# Patient Record
Sex: FEMALE | Race: BLACK OR AFRICAN AMERICAN | NOT HISPANIC OR LATINO | Employment: FULL TIME | URBAN - METROPOLITAN AREA
[De-identification: names, ages, dates, MRNs, and addresses within clinical notes are randomized per-mention and may not be internally consistent; named-entity substitution may affect disease eponyms.]

---

## 2023-10-17 ENCOUNTER — HOSPITAL ENCOUNTER (EMERGENCY)
Facility: HOSPITAL | Age: 28
Discharge: HOME | End: 2023-10-17
Attending: EMERGENCY MEDICINE
Payer: MEDICAID

## 2023-10-17 VITALS
WEIGHT: 176.94 LBS | BODY MASS INDEX: 29.48 KG/M2 | DIASTOLIC BLOOD PRESSURE: 63 MMHG | TEMPERATURE: 98 F | RESPIRATION RATE: 20 BRPM | OXYGEN SATURATION: 99 % | SYSTOLIC BLOOD PRESSURE: 117 MMHG | HEART RATE: 95 BPM | HEIGHT: 65 IN

## 2023-10-17 DIAGNOSIS — R51.9 NONINTRACTABLE HEADACHE, UNSPECIFIED CHRONICITY PATTERN, UNSPECIFIED HEADACHE TYPE: Primary | ICD-10-CM

## 2023-10-17 DIAGNOSIS — Z87.898 H/O: PITUITARY TUMOR: ICD-10-CM

## 2023-10-17 LAB
ALBUMIN SERPL BCP-MCNC: 4.2 G/DL (ref 3.5–5.2)
ALP SERPL-CCNC: 80 U/L (ref 55–135)
ALT SERPL W/O P-5'-P-CCNC: 16 U/L (ref 10–44)
AMPHET+METHAMPHET UR QL: ABNORMAL
ANION GAP SERPL CALC-SCNC: 10 MMOL/L (ref 8–16)
AST SERPL-CCNC: 19 U/L (ref 10–40)
B-HCG UR QL: NEGATIVE
BARBITURATES UR QL SCN>200 NG/ML: NEGATIVE
BASOPHILS # BLD AUTO: 0.06 K/UL (ref 0–0.2)
BASOPHILS NFR BLD: 0.8 % (ref 0–1.9)
BENZODIAZ UR QL SCN>200 NG/ML: NEGATIVE
BILIRUB SERPL-MCNC: 0.3 MG/DL (ref 0.1–1)
BILIRUB UR QL STRIP: NEGATIVE
BUN SERPL-MCNC: 7 MG/DL (ref 6–20)
BZE UR QL SCN: NEGATIVE
CALCIUM SERPL-MCNC: 9.4 MG/DL (ref 8.7–10.5)
CANNABINOIDS UR QL SCN: NEGATIVE
CHLORIDE SERPL-SCNC: 107 MMOL/L (ref 95–110)
CLARITY UR: CLEAR
CO2 SERPL-SCNC: 22 MMOL/L (ref 23–29)
COLOR UR: COLORLESS
CREAT SERPL-MCNC: 0.8 MG/DL (ref 0.5–1.4)
CREAT UR-MCNC: 97.8 MG/DL (ref 15–325)
DIFFERENTIAL METHOD: ABNORMAL
EOSINOPHIL # BLD AUTO: 0 K/UL (ref 0–0.5)
EOSINOPHIL NFR BLD: 0.4 % (ref 0–8)
ERYTHROCYTE [DISTWIDTH] IN BLOOD BY AUTOMATED COUNT: 16.4 % (ref 11.5–14.5)
EST. GFR  (NO RACE VARIABLE): >60 ML/MIN/1.73 M^2
GLUCOSE SERPL-MCNC: 92 MG/DL (ref 70–110)
GLUCOSE UR QL STRIP: NEGATIVE
HCT VFR BLD AUTO: 33.5 % (ref 37–48.5)
HGB BLD-MCNC: 10.6 G/DL (ref 12–16)
HGB UR QL STRIP: NEGATIVE
IMM GRANULOCYTES # BLD AUTO: 0.01 K/UL (ref 0–0.04)
IMM GRANULOCYTES NFR BLD AUTO: 0.1 % (ref 0–0.5)
KETONES UR QL STRIP: ABNORMAL
LEUKOCYTE ESTERASE UR QL STRIP: NEGATIVE
LYMPHOCYTES # BLD AUTO: 1.7 K/UL (ref 1–4.8)
LYMPHOCYTES NFR BLD: 23.4 % (ref 18–48)
MAGNESIUM SERPL-MCNC: 1.9 MG/DL (ref 1.6–2.6)
MCH RBC QN AUTO: 26.2 PG (ref 27–31)
MCHC RBC AUTO-ENTMCNC: 31.6 G/DL (ref 32–36)
MCV RBC AUTO: 83 FL (ref 82–98)
METHADONE UR QL SCN>300 NG/ML: NEGATIVE
MONOCYTES # BLD AUTO: 0.4 K/UL (ref 0.3–1)
MONOCYTES NFR BLD: 6 % (ref 4–15)
NEUTROPHILS # BLD AUTO: 5.1 K/UL (ref 1.8–7.7)
NEUTROPHILS NFR BLD: 69.3 % (ref 38–73)
NITRITE UR QL STRIP: NEGATIVE
NRBC BLD-RTO: 0 /100 WBC
OPIATES UR QL SCN: NEGATIVE
PCP UR QL SCN>25 NG/ML: NEGATIVE
PH UR STRIP: 7 [PH] (ref 5–8)
PLATELET # BLD AUTO: 417 K/UL (ref 150–450)
PMV BLD AUTO: 9.3 FL (ref 9.2–12.9)
POTASSIUM SERPL-SCNC: 3.4 MMOL/L (ref 3.5–5.1)
PROT SERPL-MCNC: 7.6 G/DL (ref 6–8.4)
PROT UR QL STRIP: NEGATIVE
RBC # BLD AUTO: 4.05 M/UL (ref 4–5.4)
SODIUM SERPL-SCNC: 139 MMOL/L (ref 136–145)
SP GR UR STRIP: 1.01 (ref 1–1.03)
TOXICOLOGY INFORMATION: ABNORMAL
URN SPEC COLLECT METH UR: ABNORMAL
UROBILINOGEN UR STRIP-ACNC: NEGATIVE EU/DL
WBC # BLD AUTO: 7.34 K/UL (ref 3.9–12.7)

## 2023-10-17 PROCEDURE — 85025 COMPLETE CBC W/AUTO DIFF WBC: CPT | Performed by: EMERGENCY MEDICINE

## 2023-10-17 PROCEDURE — 63600175 PHARM REV CODE 636 W HCPCS: Performed by: EMERGENCY MEDICINE

## 2023-10-17 PROCEDURE — 96374 THER/PROPH/DIAG INJ IV PUSH: CPT

## 2023-10-17 PROCEDURE — 80053 COMPREHEN METABOLIC PANEL: CPT | Performed by: EMERGENCY MEDICINE

## 2023-10-17 PROCEDURE — 80307 DRUG TEST PRSMV CHEM ANLYZR: CPT | Performed by: EMERGENCY MEDICINE

## 2023-10-17 PROCEDURE — 96375 TX/PRO/DX INJ NEW DRUG ADDON: CPT

## 2023-10-17 PROCEDURE — 25000003 PHARM REV CODE 250: Performed by: EMERGENCY MEDICINE

## 2023-10-17 PROCEDURE — 81003 URINALYSIS AUTO W/O SCOPE: CPT | Mod: 59 | Performed by: EMERGENCY MEDICINE

## 2023-10-17 PROCEDURE — 83735 ASSAY OF MAGNESIUM: CPT | Performed by: EMERGENCY MEDICINE

## 2023-10-17 PROCEDURE — 99285 EMERGENCY DEPT VISIT HI MDM: CPT | Mod: 25

## 2023-10-17 PROCEDURE — 96361 HYDRATE IV INFUSION ADD-ON: CPT

## 2023-10-17 PROCEDURE — 81025 URINE PREGNANCY TEST: CPT | Performed by: EMERGENCY MEDICINE

## 2023-10-17 PROCEDURE — 99284 EMERGENCY DEPT VISIT MOD MDM: CPT | Mod: 25

## 2023-10-17 RX ORDER — PROCHLORPERAZINE MALEATE 5 MG
10 TABLET ORAL
Status: COMPLETED | OUTPATIENT
Start: 2023-10-17 | End: 2023-10-17

## 2023-10-17 RX ORDER — DEXAMETHASONE SODIUM PHOSPHATE 4 MG/ML
10 INJECTION, SOLUTION INTRA-ARTICULAR; INTRALESIONAL; INTRAMUSCULAR; INTRAVENOUS; SOFT TISSUE
Status: DISCONTINUED | OUTPATIENT
Start: 2023-10-17 | End: 2023-10-17

## 2023-10-17 RX ORDER — KETOROLAC TROMETHAMINE 30 MG/ML
30 INJECTION, SOLUTION INTRAMUSCULAR; INTRAVENOUS
Status: DISCONTINUED | OUTPATIENT
Start: 2023-10-17 | End: 2023-10-17

## 2023-10-17 RX ORDER — METOCLOPRAMIDE HYDROCHLORIDE 5 MG/ML
10 INJECTION INTRAMUSCULAR; INTRAVENOUS
Status: DISCONTINUED | OUTPATIENT
Start: 2023-10-17 | End: 2023-10-17

## 2023-10-17 RX ORDER — DIPHENHYDRAMINE HYDROCHLORIDE 50 MG/ML
25 INJECTION INTRAMUSCULAR; INTRAVENOUS
Status: COMPLETED | OUTPATIENT
Start: 2023-10-17 | End: 2023-10-17

## 2023-10-17 RX ORDER — KETOROLAC TROMETHAMINE 30 MG/ML
30 INJECTION, SOLUTION INTRAMUSCULAR; INTRAVENOUS
Status: COMPLETED | OUTPATIENT
Start: 2023-10-17 | End: 2023-10-17

## 2023-10-17 RX ADMIN — DIPHENHYDRAMINE HYDROCHLORIDE 25 MG: 50 INJECTION, SOLUTION INTRAMUSCULAR; INTRAVENOUS at 07:10

## 2023-10-17 RX ADMIN — KETOROLAC TROMETHAMINE 30 MG: 30 INJECTION, SOLUTION INTRAMUSCULAR; INTRAVENOUS at 07:10

## 2023-10-17 RX ADMIN — PROCHLORPERAZINE MALEATE 10 MG: 5 TABLET ORAL at 07:10

## 2023-10-17 RX ADMIN — SODIUM CHLORIDE 1000 ML: 9 INJECTION, SOLUTION INTRAVENOUS at 07:10

## 2023-10-17 NOTE — FIRST PROVIDER EVALUATION
"Medical screening examination initiated.  I have conducted a focused provider triage encounter, findings are as follows:    Brief history of present illness:  Patient with history of migraine headaches presents with migraine all day today.  Reports photophobia, nausea and vomiting.  History of pituitary tumor.    Vitals:    10/17/23 1822   BP: (!) 141/81   BP Location: Right arm   Patient Position: Sitting   Pulse: (!) 120   Resp: 18   Temp: 98.3 °F (36.8 °C)   TempSrc: Oral   SpO2: 100%   Weight: 80.3 kg (176 lb 14.7 oz)   Height: 5' 5" (1.651 m)       Pertinent physical exam:  Tachycardic, no acute distress noted.    Brief workup plan:  Meds    Preliminary workup initiated; this workup will be continued and followed by the physician or advanced practice provider that is assigned to the patient when roomed.  "

## 2023-10-18 NOTE — ED PROVIDER NOTES
SCRIBE #1 NOTE: I, Deanna Lehman, am scribing for, and in the presence of, Yaneli Vegas MD. I have scribed the HPI, MARIAA, Pex.    SCRIBE #2 NOTE: I, Thea Barahona, am scribing for, and in the presence of,  Salvador Hercules MD. I have scribed the remaining portions of the note not scribed by Scribe #1.      History     Chief Complaint   Patient presents with    Headache     Pt has pituitary tumor and is scheduled for neurosurgery next month. Pt has been experiencing headaches for 11 hours. Pt c/o nausea, double vision and feeling like her heart is beating fast.      Review of patient's allergies indicates:  No Known Allergies      History of Present Illness     HPI    10/17/2023, 7:03 PM  History obtained from the patient      History of Present Illness: Zoila Sheldon is a 28 y.o. female patient with a  PMHx of pituitary tumor who presents to the Emergency Department for evaluation of headaches which onset 5:00 a.m. Pt reports just moving to this state and has not obtained new pcp. Pt states she has neurosurgery next month to remove the tumor. Symptoms are constant and moderate in severity. No mitigating or exacerbating factors reported. Associated sxs include nausea, sob, back pain. Patient denies any cp, weakness, dizziness, dysuria, fever and all other sxs at this time. No prior Tx reported. No further complaints or concerns at this time.       Arrival mode: Personal vehicle      PCP: Brittney Eastman MD        Past Medical History:  No past medical history on file.    Past Surgical History:  No past surgical history on file.      Family History:  No family history on file.    Social History:  Social History     Tobacco Use    Smoking status: Never    Smokeless tobacco: Never   Substance and Sexual Activity    Alcohol use: Not on file    Drug use: Not on file    Sexual activity: Not on file        Review of Systems     Review of Systems   Constitutional:  Negative for fever.   HENT:  Negative for  "sore throat.    Respiratory:  Positive for shortness of breath.    Cardiovascular:  Negative for chest pain.   Gastrointestinal:  Positive for nausea.   Genitourinary:  Negative for dysuria.   Musculoskeletal:  Positive for back pain.   Skin:  Negative for rash.   Neurological:  Negative for dizziness and weakness.   Hematological:  Does not bruise/bleed easily.   All other systems reviewed and are negative.       Physical Exam     Initial Vitals [10/17/23 1822]   BP Pulse Resp Temp SpO2   (!) 141/81 (!) 120 18 98.3 °F (36.8 °C) 100 %      MAP       --          Physical Exam  Nursing Notes and Vital Signs Reviewed.  Constitutional: Patient is in no apparent distress. Well-developed and well-nourished.  Head: Atraumatic. Normocephalic.  Eyes: PERRL. EOM intact. Conjunctivae are not pale. No scleral icterus.  ENT: Mucous membranes are moist. Oropharynx is clear and symmetric.    Neck: Supple. Full ROM. No lymphadenopathy.  Cardiovascular: Regular rate. Regular rhythm. No murmurs, rubs, or gallops. Distal pulses are 2+ and symmetric.  Pulmonary/Chest: No respiratory distress. Clear to auscultation bilaterally. No wheezing or rales.  Abdominal: Soft and non-distended.  There is no tenderness.  No rebound, guarding, or rigidity. Good bowel sounds.  Genitourinary: No CVA tenderness  Musculoskeletal: Moves all extremities. No obvious deformities. No edema. No calf tenderness.  Skin: Warm and dry.  Neurological:  Alert, awake, and appropriate.  Normal speech.  No acute focal neurological deficits are appreciated.  Psychiatric: Normal affect. Good eye contact. Appropriate in content.     ED Course   Procedures  ED Vital Signs:  Vitals:    10/17/23 1822 10/17/23 1918 10/17/23 2030 10/17/23 2100   BP: (!) 141/81 131/82 135/78 128/75   Pulse: (!) 120 95 99 90   Resp: 18 20 (!) 23 19   Temp: 98.3 °F (36.8 °C)      TempSrc: Oral      SpO2: 100% 100% 100% 100%   Weight: 80.3 kg (176 lb 14.7 oz)      Height: 5' 5" (1.651 m)       " 10/17/23 2200 10/17/23 2230   BP: 124/74 117/63   Pulse: 89 95   Resp: 19 20   Temp:     TempSrc:     SpO2: 99% 99%   Weight:     Height:         Abnormal Lab Results:  Labs Reviewed   CBC W/ AUTO DIFFERENTIAL - Abnormal; Notable for the following components:       Result Value    Hemoglobin 10.6 (*)     Hematocrit 33.5 (*)     MCH 26.2 (*)     MCHC 31.6 (*)     RDW 16.4 (*)     All other components within normal limits   COMPREHENSIVE METABOLIC PANEL - Abnormal; Notable for the following components:    Potassium 3.4 (*)     CO2 22 (*)     All other components within normal limits   URINALYSIS, REFLEX TO URINE CULTURE - Abnormal; Notable for the following components:    Color, UA Colorless (*)     Ketones, UA 1+ (*)     All other components within normal limits    Narrative:     Specimen Source->Urine   DRUG SCREEN PANEL, URINE EMERGENCY - Abnormal; Notable for the following components:    Amphetamine Screen, Ur Presumptive Positive (*)     All other components within normal limits    Narrative:     Specimen Source->Urine   PREGNANCY TEST, URINE RAPID    Narrative:     Specimen Source->Urine   MAGNESIUM        All Lab Results:  Results for orders placed or performed during the hospital encounter of 10/17/23   CBC auto differential   Result Value Ref Range    WBC 7.34 3.90 - 12.70 K/uL    RBC 4.05 4.00 - 5.40 M/uL    Hemoglobin 10.6 (L) 12.0 - 16.0 g/dL    Hematocrit 33.5 (L) 37.0 - 48.5 %    MCV 83 82 - 98 fL    MCH 26.2 (L) 27.0 - 31.0 pg    MCHC 31.6 (L) 32.0 - 36.0 g/dL    RDW 16.4 (H) 11.5 - 14.5 %    Platelets 417 150 - 450 K/uL    MPV 9.3 9.2 - 12.9 fL    Immature Granulocytes 0.1 0.0 - 0.5 %    Gran # (ANC) 5.1 1.8 - 7.7 K/uL    Immature Grans (Abs) 0.01 0.00 - 0.04 K/uL    Lymph # 1.7 1.0 - 4.8 K/uL    Mono # 0.4 0.3 - 1.0 K/uL    Eos # 0.0 0.0 - 0.5 K/uL    Baso # 0.06 0.00 - 0.20 K/uL    nRBC 0 0 /100 WBC    Gran % 69.3 38.0 - 73.0 %    Lymph % 23.4 18.0 - 48.0 %    Mono % 6.0 4.0 - 15.0 %    Eosinophil %  0.4 0.0 - 8.0 %    Basophil % 0.8 0.0 - 1.9 %    Differential Method Automated    Comprehensive metabolic panel   Result Value Ref Range    Sodium 139 136 - 145 mmol/L    Potassium 3.4 (L) 3.5 - 5.1 mmol/L    Chloride 107 95 - 110 mmol/L    CO2 22 (L) 23 - 29 mmol/L    Glucose 92 70 - 110 mg/dL    BUN 7 6 - 20 mg/dL    Creatinine 0.8 0.5 - 1.4 mg/dL    Calcium 9.4 8.7 - 10.5 mg/dL    Total Protein 7.6 6.0 - 8.4 g/dL    Albumin 4.2 3.5 - 5.2 g/dL    Total Bilirubin 0.3 0.1 - 1.0 mg/dL    Alkaline Phosphatase 80 55 - 135 U/L    AST 19 10 - 40 U/L    ALT 16 10 - 44 U/L    eGFR >60 >60 mL/min/1.73 m^2    Anion Gap 10 8 - 16 mmol/L   Urinalysis, Reflex to Urine Culture Urine, Clean Catch    Specimen: Urine   Result Value Ref Range    Specimen UA Urine, Clean Catch     Color, UA Colorless (A) Yellow, Straw, Nika    Appearance, UA Clear Clear    pH, UA 7.0 5.0 - 8.0    Specific Gravity, UA 1.010 1.005 - 1.030    Protein, UA Negative Negative    Glucose, UA Negative Negative    Ketones, UA 1+ (A) Negative    Bilirubin (UA) Negative Negative    Occult Blood UA Negative Negative    Nitrite, UA Negative Negative    Urobilinogen, UA Negative <2.0 EU/dL    Leukocytes, UA Negative Negative   Pregnancy, urine rapid (UPT)   Result Value Ref Range    Preg Test, Ur Negative    Drug screen panel, emergency   Result Value Ref Range    Benzodiazepines Negative Negative    Methadone metabolites Negative Negative    Cocaine (Metab.) Negative Negative    Opiate Scrn, Ur Negative Negative    Barbiturate Screen, Ur Negative Negative    Amphetamine Screen, Ur Presumptive Positive (A) Negative    THC Negative Negative    Phencyclidine Negative Negative    Creatinine, Urine 97.8 15.0 - 325.0 mg/dL    Toxicology Information SEE COMMENT    Magnesium   Result Value Ref Range    Magnesium 1.9 1.6 - 2.6 mg/dL        Imaging Results:  Imaging Results              CT Head Without Contrast (Final result)  Result time 10/17/23 22:08:52      Final  result by Abraham Rodriguez MD (10/17/23 22:08:52)                   Impression:      No acute abnormality.    No definite abnormality in this patient with a history of pituitary tumor, although contrast-enhanced MRI would provide a more sensitive evaluation.    All CT scans at this facility are performed  using dose modulation techniques as appropriate to performed exam including the following:  automated exposure control; adjustment of mA and/or kV according to the patients size (this includes techniques or standardized protocols for targeted exams where dose is matched to indication/reason for exam: i.e. extremities or head);  iterative reconstruction technique.      Electronically signed by: Abraham Rodriguez  Date:    10/17/2023  Time:    22:08               Narrative:    EXAMINATION:  CT HEAD WITHOUT CONTRAST    CLINICAL HISTORY:  Headache, chronic, new features or increased frequency;h/o pituitary tumor;    TECHNIQUE:  Low dose axial CT images obtained throughout the head without intravenous contrast. Sagittal and coronal reconstructions were performed.    COMPARISON:  None.    FINDINGS:  Intracranial compartment:    Ventricles and sulci are normal in size for age without evidence of hydrocephalus. No extra-axial blood or fluid collections.    The brain parenchyma appears normal. No parenchymal mass, hemorrhage, edema or major vascular distribution infarct.    Pituitary grossly within normal limits via CT. No definite abnormality in this patient with a history of pituitary tumor, although contrast-enhanced MRI would provide a more sensitive evaluation.    Skull/extracranial contents (limited evaluation): No fracture. Mastoid air cells and paranasal sinuses are essentially clear.                                        ED Discussion     8:00 PM: Dr. Vegas transfers care of patient to Dr. Hercules pending lab results.     8:06 PM: Dr. Hercules agrees with Dr. Vegas's assessment and plan of care. Evaluated pt. Pt  is resting comfortably and is in no acute distress.  D/w pt all pertinent results. D/w pt any concerns expressed at this time. Answered all questions. Pt expresses understanding at this time.    10:36 PM: Reassessed pt at this time.  Discussed with pt all pertinent ED information and results. Discussed pt dx and plan of tx. Gave pt all f/u and return to the ED instructions. All questions and concerns were addressed at this time. Pt expresses understanding of information and instructions, and is comfortable with plan to discharge. Pt is stable for discharge.    I discussed with patient and/or family/caretaker that evaluation in the ED does not suggest any emergent or life threatening medical conditions requiring immediate intervention beyond what was provided in the ED, and I believe patient is safe for discharge.  Regardless, an unremarkable evaluation in the ED does not preclude the development or presence of a serious of life threatening condition. As such, patient was instructed to return immediately for any worsening or change in current symptoms.         Medical Decision Making  Amount and/or Complexity of Data Reviewed  Labs: ordered. Decision-making details documented in ED Course.  Radiology: ordered. Decision-making details documented in ED Course.    Risk  Prescription drug management.                ED Medication(s):  Medications   sodium chloride 0.9% bolus 1,000 mL 1,000 mL (0 mLs Intravenous Stopped 10/17/23 2045)   ketorolac injection 30 mg (30 mg Intravenous Given 10/17/23 1917)   diphenhydrAMINE injection 25 mg (25 mg Intravenous Given 10/17/23 1921)   prochlorperazine tablet 10 mg (10 mg Oral Given 10/17/23 1916)       Discharge Medication List as of 10/17/2023 10:43 PM           Follow-up Information       Brittney Eastman MD In 2 days.    Specialty: Pediatrics  Contact information:  [address]  [place] [address]  [phone]               [facility] - Emergency Dept..    Specialty:  Emergency Medicine  Why: As needed, If symptoms worsen  Contact information:  [address]  [facility] [address] [phone]             Brittney Eastman MD In 2 days.    Specialty: Pediatrics  Contact information:  [address]  [place] [address]  [phone]               [facility] - Emergency Dept..    Specialty: Emergency Medicine  Why: As needed, If symptoms worsen  Contact information:  [address] [facility] [address] [phone]                               Scribe Attestation:   Scribe #1: I performed the above scribed service and the documentation accurately describes the services I performed. I attest to the accuracy of the note.     Attending:   Physician Attestation Statement for Scribe #1: I, Yaneli Vegas MD, personally performed the services described in this documentation, as scribed by Deanna Lehman, in my presence, and it is both accurate and complete.       Scribe Attestation:   Scribe #2: I performed the above scribed service and the documentation accurately describes the services I performed. I attest to the accuracy of the note.    Attending Attestation:           Physician Attestation for Scribe:    Physician Attestation Statement for Scribe #2: I, Salvador Hercules MD, reviewed documentation, as scribed by Thea Barahona in my presence, and it is both accurate and complete. I also acknowledge and confirm the content of the note done by Scribe #1.           Clinical Impression       ICD-10-CM ICD-9-CM   1. Nonintractable headache, unspecified chronicity pattern, unspecified headache type  R51.9 784.0   2. H/O: pituitary tumor  Z87.898 V12.29       Disposition:   Disposition: Discharged  Condition: Stable        Salvador Hercules MD  11/09/23 0206

## 2023-11-02 ENCOUNTER — TELEPHONE (OUTPATIENT)
Dept: NEUROSURGERY | Facility: CLINIC | Age: 28
End: 2023-11-02
Payer: MEDICAID

## 2023-11-02 NOTE — TELEPHONE ENCOUNTER
Attempted to contact patient regarding appointment with Dr. EUGENE neurosurgery. Patient needs to follow up with a neurosurgeon in [place] due to patient's condition being brain related. Cellular not in service.

## 2023-11-09 ENCOUNTER — HOSPITAL ENCOUNTER (EMERGENCY)
Facility: HOSPITAL | Age: 28
Discharge: HOME | End: 2023-11-09
Attending: EMERGENCY MEDICINE
Payer: MEDICAID

## 2023-11-09 VITALS
HEIGHT: 65 IN | OXYGEN SATURATION: 100 % | RESPIRATION RATE: 14 BRPM | BODY MASS INDEX: 29.49 KG/M2 | DIASTOLIC BLOOD PRESSURE: 69 MMHG | TEMPERATURE: 99 F | SYSTOLIC BLOOD PRESSURE: 121 MMHG | HEART RATE: 100 BPM | WEIGHT: 177 LBS

## 2023-11-09 DIAGNOSIS — J06.9 VIRAL URI: Primary | ICD-10-CM

## 2023-11-09 DIAGNOSIS — R07.9 CHEST PAIN: ICD-10-CM

## 2023-11-09 DIAGNOSIS — R00.0 SINUS TACHYCARDIA: ICD-10-CM

## 2023-11-09 DIAGNOSIS — E87.6 HYPOKALEMIA: ICD-10-CM

## 2023-11-09 LAB
ALBUMIN SERPL BCP-MCNC: 4.4 G/DL (ref 3.5–5.2)
ALP SERPL-CCNC: 71 U/L (ref 55–135)
ALT SERPL W/O P-5'-P-CCNC: 32 U/L (ref 10–44)
ANION GAP SERPL CALC-SCNC: 12 MMOL/L (ref 8–16)
AST SERPL-CCNC: 47 U/L (ref 10–40)
B-HCG UR QL: NEGATIVE
BASOPHILS # BLD AUTO: 0.06 K/UL (ref 0–0.2)
BASOPHILS NFR BLD: 0.4 % (ref 0–1.9)
BILIRUB SERPL-MCNC: 0.6 MG/DL (ref 0.1–1)
BNP SERPL-MCNC: <10 PG/ML (ref 0–99)
BUN SERPL-MCNC: 9 MG/DL (ref 6–20)
CALCIUM SERPL-MCNC: 9.4 MG/DL (ref 8.7–10.5)
CHLORIDE SERPL-SCNC: 107 MMOL/L (ref 95–110)
CO2 SERPL-SCNC: 18 MMOL/L (ref 23–29)
CREAT SERPL-MCNC: 0.9 MG/DL (ref 0.5–1.4)
D DIMER PPP IA.FEU-MCNC: 0.41 MG/L FEU
DIFFERENTIAL METHOD: ABNORMAL
EOSINOPHIL # BLD AUTO: 0 K/UL (ref 0–0.5)
EOSINOPHIL NFR BLD: 0.1 % (ref 0–8)
ERYTHROCYTE [DISTWIDTH] IN BLOOD BY AUTOMATED COUNT: 16.9 % (ref 11.5–14.5)
EST. GFR  (NO RACE VARIABLE): >60 ML/MIN/1.73 M^2
GLUCOSE SERPL-MCNC: 102 MG/DL (ref 70–110)
HCT VFR BLD AUTO: 34.9 % (ref 37–48.5)
HGB BLD-MCNC: 11.1 G/DL (ref 12–16)
IMM GRANULOCYTES # BLD AUTO: 0.07 K/UL (ref 0–0.04)
IMM GRANULOCYTES NFR BLD AUTO: 0.5 % (ref 0–0.5)
INFLUENZA A, MOLECULAR: NEGATIVE
INFLUENZA B, MOLECULAR: NEGATIVE
LYMPHOCYTES # BLD AUTO: 0.6 K/UL (ref 1–4.8)
LYMPHOCYTES NFR BLD: 4.6 % (ref 18–48)
MCH RBC QN AUTO: 26.2 PG (ref 27–31)
MCHC RBC AUTO-ENTMCNC: 31.8 G/DL (ref 32–36)
MCV RBC AUTO: 83 FL (ref 82–98)
MONOCYTES # BLD AUTO: 0.4 K/UL (ref 0.3–1)
MONOCYTES NFR BLD: 3.3 % (ref 4–15)
NEUTROPHILS # BLD AUTO: 12.3 K/UL (ref 1.8–7.7)
NEUTROPHILS NFR BLD: 91.1 % (ref 38–73)
NRBC BLD-RTO: 0 /100 WBC
PLATELET # BLD AUTO: 406 K/UL (ref 150–450)
PMV BLD AUTO: 9.6 FL (ref 9.2–12.9)
POTASSIUM SERPL-SCNC: 3.4 MMOL/L (ref 3.5–5.1)
PROT SERPL-MCNC: 7.9 G/DL (ref 6–8.4)
RBC # BLD AUTO: 4.23 M/UL (ref 4–5.4)
SARS-COV-2 RDRP RESP QL NAA+PROBE: NEGATIVE
SODIUM SERPL-SCNC: 137 MMOL/L (ref 136–145)
SPECIMEN SOURCE: NORMAL
TROPONIN I SERPL DL<=0.01 NG/ML-MCNC: <0.006 NG/ML (ref 0–0.03)
WBC # BLD AUTO: 13.45 K/UL (ref 3.9–12.7)

## 2023-11-09 PROCEDURE — 87502 INFLUENZA DNA AMP PROBE: CPT | Performed by: EMERGENCY MEDICINE

## 2023-11-09 PROCEDURE — 93010 ELECTROCARDIOGRAM REPORT: CPT | Mod: ,,, | Performed by: INTERNAL MEDICINE

## 2023-11-09 PROCEDURE — 25000003 PHARM REV CODE 250: Performed by: EMERGENCY MEDICINE

## 2023-11-09 PROCEDURE — 83880 ASSAY OF NATRIURETIC PEPTIDE: CPT | Performed by: NURSE PRACTITIONER

## 2023-11-09 PROCEDURE — 85025 COMPLETE CBC W/AUTO DIFF WBC: CPT | Performed by: NURSE PRACTITIONER

## 2023-11-09 PROCEDURE — 93005 ELECTROCARDIOGRAM TRACING: CPT

## 2023-11-09 PROCEDURE — 96360 HYDRATION IV INFUSION INIT: CPT

## 2023-11-09 PROCEDURE — 99285 EMERGENCY DEPT VISIT HI MDM: CPT | Mod: 25

## 2023-11-09 PROCEDURE — 85379 FIBRIN DEGRADATION QUANT: CPT | Performed by: NURSE PRACTITIONER

## 2023-11-09 PROCEDURE — 84484 ASSAY OF TROPONIN QUANT: CPT | Performed by: NURSE PRACTITIONER

## 2023-11-09 PROCEDURE — 93010 EKG 12-LEAD: ICD-10-PCS | Mod: ,,, | Performed by: INTERNAL MEDICINE

## 2023-11-09 PROCEDURE — 80053 COMPREHEN METABOLIC PANEL: CPT | Performed by: NURSE PRACTITIONER

## 2023-11-09 PROCEDURE — 36415 COLL VENOUS BLD VENIPUNCTURE: CPT | Performed by: NURSE PRACTITIONER

## 2023-11-09 PROCEDURE — U0002 COVID-19 LAB TEST NON-CDC: HCPCS | Performed by: EMERGENCY MEDICINE

## 2023-11-09 PROCEDURE — 81025 URINE PREGNANCY TEST: CPT | Performed by: EMERGENCY MEDICINE

## 2023-11-09 RX ORDER — POTASSIUM CHLORIDE 20 MEQ/1
40 TABLET, EXTENDED RELEASE ORAL
Status: COMPLETED | OUTPATIENT
Start: 2023-11-09 | End: 2023-11-09

## 2023-11-09 RX ADMIN — POTASSIUM BICARBONATE 25 MEQ: 978 TABLET, EFFERVESCENT ORAL at 11:11

## 2023-11-09 RX ADMIN — POTASSIUM CHLORIDE 40 MEQ: 1500 TABLET, EXTENDED RELEASE ORAL at 11:11

## 2023-11-09 RX ADMIN — SODIUM CHLORIDE 500 ML: 9 INJECTION, SOLUTION INTRAVENOUS at 10:11

## 2023-11-09 NOTE — ED NOTES
Pt vomited after PO potassium s/t to size. MD notified. Pt reports difficulty swallowing disintegrating potassium s/t taste. MD notified. Pt to attempt to slowly drink disintegrating potassium.

## 2023-11-09 NOTE — ED PROVIDER NOTES
"SCRIBE #1 NOTE: I, Inés Hamilton, am scribing for, and in the presence of, Evan Hannon Jr., MD. I have scribed the entire note.       History     Chief Complaint   Patient presents with    Chest Pain     Chest pain x several days worsened today, pt reports all extremity weakness and states "my whole body feels heavy"      Review of patient's allergies indicates:  No Known Allergies      History of Present Illness     HPI    11/9/2023, 10:22 AM  History obtained from the patient      History of Present Illness: Zoila Sheldon is a 28 y.o. female patient with a PMHx of adrenal deficiency and pituitary tumor  who presents to the Emergency Department for evaluation of CP which onset a few days PTA. Pt states that her chest began hurting a few days ago and it felt like pressure on her chest, but today the pain became unbearable. She states that the pain feels like a stinging pain. Symptoms are constant and moderate in severity. No mitigating or exacerbating factors reported. Associated sxs include light-headedness, double vision. Patient denies any n/v, fever, chills, cough, SOB, headaches, and all other sxs at this time. No prior Tx mentioned. She denies any recent travel, recent surgeries, or history of smoking. No further complaints or concerns at this time.       Arrival mode: Personal vehicle    PCP: Brittney Eastman MD        Past Medical History:  Past Medical History:   Diagnosis Date    Pituitary tumor        Past Surgical History:  No past surgical history on file.      Family History:  No family history on file.    Social History:  Social History     Tobacco Use    Smoking status: Never    Smokeless tobacco: Never   Substance and Sexual Activity    Alcohol use: Not on file    Drug use: Not on file    Sexual activity: Not on file        Review of Systems     Review of Systems   Constitutional:  Negative for chills and fever.   HENT:  Negative for sore throat.    Eyes:         (+) " double vision   Respiratory:  Negative for cough and shortness of breath.    Cardiovascular:  Positive for chest pain.   Gastrointestinal:  Negative for nausea and vomiting.   Genitourinary:  Negative for dysuria.   Musculoskeletal:  Negative for back pain.   Skin:  Negative for rash.   Neurological:  Positive for light-headedness. Negative for weakness and headaches.   Hematological:  Does not bruise/bleed easily.   All other systems reviewed and are negative.       Physical Exam     Initial Vitals [11/09/23 0934]   BP Pulse Resp Temp SpO2   111/72 (!) 118 20 98.8 °F (37.1 °C) 100 %      MAP       --          Physical Exam  Nursing Notes and Vital Signs Reviewed.  Constitutional: Patient is in no acute distress. Well-developed and well-nourished.  Head: Atraumatic. Normocephalic.  Eyes:  EOM intact.  No scleral icterus.  ENT: Mucous membranes are moist.  Nares clear   Neck:  Full ROM. No JVD.  Cardiovascular: Regular rate. Regular rhythm No murmurs, rubs, or gallops. Distal pulses are 2+ and symmetric  Pulmonary/Chest: No respiratory distress. Clear to auscultation bilaterally. No wheezing or rales.  Equal chest wall rise bilaterally  Abdominal: Soft and non-distended.  There is no tenderness.  No rebound, guarding, or rigidity. Good bowel sounds.  Genitourinary: No CVA tenderness.  No suprapubic tenderness  Musculoskeletal: Moves all extremities. No obvious deformities.  5 x 5 strength in all extremities   Skin: Warm and dry.  Neurological:  Alert, awake, and appropriate.  Normal speech.  No acute focal neurological deficits are appreciated.  Two through 12 intact bilaterally.  Psychiatric: Normal affect. Good eye contact. Appropriate in content.      ED Course   Procedures  ED Vital Signs:  Vitals:    11/09/23 0934 11/09/23 1015 11/09/23 1016   BP: 111/72 131/75    Pulse: (!) 118 109 109   Resp: 20 18    Temp: 98.8 °F (37.1 °C)     TempSrc: Oral     SpO2: 100% 100%    Weight: 80.3 kg (177 lb 0.5 oz)     Height:  "5' 5" (1.651 m)         Abnormal Lab Results:  Labs Reviewed   CBC W/ AUTO DIFFERENTIAL - Abnormal; Notable for the following components:       Result Value    WBC 13.45 (*)     Hemoglobin 11.1 (*)     Hematocrit 34.9 (*)     MCH 26.2 (*)     MCHC 31.8 (*)     RDW 16.9 (*)     Gran # (ANC) 12.3 (*)     Immature Grans (Abs) 0.07 (*)     Lymph # 0.6 (*)     Gran % 91.1 (*)     Lymph % 4.6 (*)     Mono % 3.3 (*)     All other components within normal limits   COMPREHENSIVE METABOLIC PANEL - Abnormal; Notable for the following components:    Potassium 3.4 (*)     CO2 18 (*)     AST 47 (*)     All other components within normal limits   INFLUENZA A & B BY MOLECULAR   TROPONIN I   B-TYPE NATRIURETIC PEPTIDE   SARS-COV-2 RNA AMPLIFICATION, QUAL   PREGNANCY TEST, URINE RAPID    Narrative:     Specimen Source->Urine   D DIMER, QUANTITATIVE   D DIMER, QUANTITATIVE   TROPONIN I        All Lab Results:  Results for orders placed or performed during the hospital encounter of 11/09/23   Influenza A & B by Molecular    Specimen: Nasopharyngeal Swab   Result Value Ref Range    Influenza A, Molecular Negative Negative    Influenza B, Molecular Negative Negative    Flu A & B Source Nasal swab    CBC auto differential   Result Value Ref Range    WBC 13.45 (H) 3.90 - 12.70 K/uL    RBC 4.23 4.00 - 5.40 M/uL    Hemoglobin 11.1 (L) 12.0 - 16.0 g/dL    Hematocrit 34.9 (L) 37.0 - 48.5 %    MCV 83 82 - 98 fL    MCH 26.2 (L) 27.0 - 31.0 pg    MCHC 31.8 (L) 32.0 - 36.0 g/dL    RDW 16.9 (H) 11.5 - 14.5 %    Platelets 406 150 - 450 K/uL    MPV 9.6 9.2 - 12.9 fL    Immature Granulocytes 0.5 0.0 - 0.5 %    Gran # (ANC) 12.3 (H) 1.8 - 7.7 K/uL    Immature Grans (Abs) 0.07 (H) 0.00 - 0.04 K/uL    Lymph # 0.6 (L) 1.0 - 4.8 K/uL    Mono # 0.4 0.3 - 1.0 K/uL    Eos # 0.0 0.0 - 0.5 K/uL    Baso # 0.06 0.00 - 0.20 K/uL    nRBC 0 0 /100 WBC    Gran % 91.1 (H) 38.0 - 73.0 %    Lymph % 4.6 (L) 18.0 - 48.0 %    Mono % 3.3 (L) 4.0 - 15.0 %    Eosinophil % 0.1 " 0.0 - 8.0 %    Basophil % 0.4 0.0 - 1.9 %    Differential Method Automated    Comprehensive metabolic panel   Result Value Ref Range    Sodium 137 136 - 145 mmol/L    Potassium 3.4 (L) 3.5 - 5.1 mmol/L    Chloride 107 95 - 110 mmol/L    CO2 18 (L) 23 - 29 mmol/L    Glucose 102 70 - 110 mg/dL    BUN 9 6 - 20 mg/dL    Creatinine 0.9 0.5 - 1.4 mg/dL    Calcium 9.4 8.7 - 10.5 mg/dL    Total Protein 7.9 6.0 - 8.4 g/dL    Albumin 4.4 3.5 - 5.2 g/dL    Total Bilirubin 0.6 0.1 - 1.0 mg/dL    Alkaline Phosphatase 71 55 - 135 U/L    AST 47 (H) 10 - 40 U/L    ALT 32 10 - 44 U/L    eGFR >60 >60 mL/min/1.73 m^2    Anion Gap 12 8 - 16 mmol/L   Troponin I #1   Result Value Ref Range    Troponin I <0.006 0.000 - 0.026 ng/mL   BNP   Result Value Ref Range    BNP <10 0 - 99 pg/mL   COVID-19 Rapid Screening   Result Value Ref Range    SARS-CoV-2 RNA, Amplification, Qual Negative Negative   Pregnancy, urine rapid (UPT)   Result Value Ref Range    Preg Test, Ur Negative    D-Dimer, Quantitative   Result Value Ref Range    D-Dimer 0.41 <0.50 mg/L FEU        Imaging Results:  Imaging Results              X-Ray Chest AP Portable (Final result)  Result time 11/09/23 10:34:53      Final result by Gaurang Palacios MD (11/09/23 10:34:53)                   Impression:      No acute findings.      Electronically signed by: Gaurang Palacios MD  Date:    11/09/2023  Time:    10:34               Narrative:    EXAMINATION:  XR CHEST AP PORTABLE    CLINICAL HISTORY:  Chest Pain;    TECHNIQUE:  Single frontal view of the chest was performed.    COMPARISON:  None    FINDINGS:  The cardiomediastinal silhouette is normal.    The lungs are clear.  No pleural effusions.    No acute osseous findings.  No advanced arthritic changes.                                       The EKG was ordered, reviewed, and independently interpreted by the ED provider.  Interpretation time: 9:32  Rate: 108 BPM  Rhythm: sinus tachycardia  Interpretation: Nonspecific T wave  "abnormality. No STEMI.             The Emergency Provider reviewed the vital signs and test results, which are outlined above.     ED Discussion           ED Course as of 11/09/23 1105   Thu Nov 09, 2023   1056 Cardiac monitor interpretation  Independent interpretation  Indication: Chest pain  Rhythm.  One hundred two.  No STEMI [RT]      ED Course User Index  [RT] Evan Hannon Jr., MD     Medical Decision Making  Differential diagnosis: Viral URI, hypokalemia, chest pain, pulmonary embolus    Amount and/or Complexity of Data Reviewed  Labs: ordered. Decision-making details documented in ED Course.  Radiology: ordered. Decision-making details documented in ED Course.  ECG/medicine tests: ordered and independent interpretation performed. Decision-making details documented in ED Course.    Risk  OTC drugs.  Prescription drug management.  Drug therapy requiring intensive monitoring for toxicity.  Decision regarding hospitalization.  Risk Details: Wells criteria" 1.5.  Patient given potassium orally.  Consideration for hospitalization made however the patient did not necessitate this               ED Medication(s):  Medications   sodium chloride 0.9% bolus 500 mL 500 mL (500 mLs Intravenous New Bag 11/9/23 1036)   potassium chloride SA CR tablet 40 mEq (has no administration in time range)       New Prescriptions    No medications on file        Follow-up Information       Brittney Eastman MD.    Specialty: Pediatrics  Contact information:  [address]  [place] [address] [phone]                                 Scribe Attestation:   Scribe #1: I performed the above scribed service and the documentation accurately describes the services I performed. I attest to the accuracy of the note.     Attending:   Physician Attestation Statement for Scribe #1: I, Evan Hannon Jr., MD, personally performed the services described in this documentation, as scribed by Inés Hamilton, in my presence, and " it is both accurate and complete.           Clinical Impression       ICD-10-CM ICD-9-CM   1. Viral URI  J06.9 465.9   2. Chest pain  R07.9 786.50   3. Hypokalemia  E87.6 276.8   4. Sinus tachycardia  R00.0 427.89       Disposition:   Disposition: Discharged  Condition: Stable        Evan Hannon Jr., MD  11/09/23 1105       Evan Hannon Jr., MD  11/09/23 1121

## 2024-12-03 ENCOUNTER — LAB VISIT (OUTPATIENT)
Dept: LAB | Facility: HOSPITAL | Age: 29
End: 2024-12-03
Attending: NURSE PRACTITIONER
Payer: COMMERCIAL

## 2024-12-03 ENCOUNTER — OFFICE VISIT (OUTPATIENT)
Dept: OBSTETRICS AND GYNECOLOGY | Facility: CLINIC | Age: 29
End: 2024-12-03
Payer: COMMERCIAL

## 2024-12-03 VITALS
HEIGHT: 65 IN | WEIGHT: 172.63 LBS | SYSTOLIC BLOOD PRESSURE: 114 MMHG | DIASTOLIC BLOOD PRESSURE: 68 MMHG | BODY MASS INDEX: 28.76 KG/M2

## 2024-12-03 DIAGNOSIS — Z11.3 SCREEN FOR STD (SEXUALLY TRANSMITTED DISEASE): ICD-10-CM

## 2024-12-03 DIAGNOSIS — Z01.419 ROUTINE GYNECOLOGICAL EXAMINATION: Primary | ICD-10-CM

## 2024-12-03 LAB
HIV 1+2 AB+HIV1 P24 AG SERPL QL IA: NORMAL
TREPONEMA PALLIDUM IGG+IGM AB [PRESENCE] IN SERUM OR PLASMA BY IMMUNOASSAY: NONREACTIVE

## 2024-12-03 PROCEDURE — 80074 ACUTE HEPATITIS PANEL: CPT | Performed by: NURSE PRACTITIONER

## 2024-12-03 PROCEDURE — 1160F RVW MEDS BY RX/DR IN RCRD: CPT | Mod: CPTII,S$GLB,, | Performed by: NURSE PRACTITIONER

## 2024-12-03 PROCEDURE — 1159F MED LIST DOCD IN RCRD: CPT | Mod: CPTII,S$GLB,, | Performed by: NURSE PRACTITIONER

## 2024-12-03 PROCEDURE — 99999 PR PBB SHADOW E&M-EST. PATIENT-LVL III: CPT | Mod: PBBFAC,,, | Performed by: NURSE PRACTITIONER

## 2024-12-03 PROCEDURE — 87591 N.GONORRHOEAE DNA AMP PROB: CPT | Performed by: NURSE PRACTITIONER

## 2024-12-03 PROCEDURE — 86593 SYPHILIS TEST NON-TREP QUANT: CPT | Performed by: NURSE PRACTITIONER

## 2024-12-03 PROCEDURE — 3008F BODY MASS INDEX DOCD: CPT | Mod: CPTII,S$GLB,, | Performed by: NURSE PRACTITIONER

## 2024-12-03 PROCEDURE — 99385 PREV VISIT NEW AGE 18-39: CPT | Mod: S$GLB,,, | Performed by: NURSE PRACTITIONER

## 2024-12-03 PROCEDURE — 3078F DIAST BP <80 MM HG: CPT | Mod: CPTII,S$GLB,, | Performed by: NURSE PRACTITIONER

## 2024-12-03 PROCEDURE — 36415 COLL VENOUS BLD VENIPUNCTURE: CPT | Performed by: NURSE PRACTITIONER

## 2024-12-03 PROCEDURE — 87389 HIV-1 AG W/HIV-1&-2 AB AG IA: CPT | Performed by: NURSE PRACTITIONER

## 2024-12-03 PROCEDURE — 3074F SYST BP LT 130 MM HG: CPT | Mod: CPTII,S$GLB,, | Performed by: NURSE PRACTITIONER

## 2024-12-03 NOTE — PROGRESS NOTES
"  Subjective:       Patient ID: Zoila Sheldon is a 29 y.o. female.    Chief Complaint:  Annual Exam      History of Present Illness  HPI  Desires tubal ligation and std screening  No pelvic/vaginal complaints     Health Maintenance   Topic Date Due    Hepatitis C Screening  Never done    HIV Screening  Never done    TETANUS VACCINE  10/18/2017    Pap Smear  Never done    Influenza Vaccine (1) 2024    COVID-19 Vaccine (2024- season) Never done    RSV Vaccine (Age 60+ and Pregnant patients) (1 - 1-dose 75+ series) 2071    Lipid Panel  Completed    Pneumococcal Vaccines (Age 0-64)  Aged Out     GYN & OB History  Patient's last menstrual period was 2024 (exact date).   Date of Last Pap:  negative results in [place]     OB History    Para Term  AB Living   7       3 4   SAB IAB Ectopic Multiple Live Births   3              # Outcome Date GA Lbr [place]/2nd Weight Sex Type Anes PTL Lv   7             6             5             4             3 SAB            2 SAB            1 SAB                Review of Systems  Review of Systems        Objective:   /68   Ht 5' 5" (1.651 m)   Wt 78.3 kg (172 lb 9.9 oz)   LMP 2024 (Exact Date)   BMI 28.73 kg/m²    Physical Exam:   Constitutional: She is oriented to person, place, and time. She appears well-developed and well-nourished.               Genitourinary:    Pelvic exam was performed with patient in the lithotomy position.   The external female genitalia was normal.   Genitalia hair distrobution normal .                 Neurological: She is alert and oriented to person, place, and time.    Skin: Skin is warm and dry.    Psychiatric: She has a normal mood and affect. Her behavior is normal. Judgment and thought content normal.      Assessment:        1. Routine gynecological examination    2. Screen for STD (sexually transmitted disease)                Plan:            Zoila was " seen today for annual exam.    Diagnoses and all orders for this visit:    Routine gynecological examination    Screen for STD (sexually transmitted disease)  -     C. trachomatis/N. gonorrhoeae by AMP DNA [facility]; Vagina; Future  -     HIV 1/2 Ag/Ab (4th Gen); Future  -     Treponema Pallidium Antibodies IgG, IgM; Future  -     Hepatitis Panel, Acute; Future  -     C. trachomatis/N. gonorrhoeae by AMP DNA [facility]; Vagina      Return to clinic in one year for WWE   Refer to GYN MD for tubal ligation consultation

## 2024-12-04 LAB
C TRACH DNA SPEC QL NAA+PROBE: NOT DETECTED
HAV IGM SERPL QL IA: NORMAL
HBV CORE IGM SERPL QL IA: NORMAL
HBV SURFACE AG SERPL QL IA: NORMAL
HCV AB SERPL QL IA: NORMAL
N GONORRHOEA DNA SPEC QL NAA+PROBE: NOT DETECTED

## 2025-01-07 ENCOUNTER — TELEPHONE (OUTPATIENT)
Dept: OBSTETRICS AND GYNECOLOGY | Facility: CLINIC | Age: 30
End: 2025-01-07
Payer: COMMERCIAL

## 2025-01-07 ENCOUNTER — OFFICE VISIT (OUTPATIENT)
Dept: INTERNAL MEDICINE | Facility: CLINIC | Age: 30
End: 2025-01-07
Payer: COMMERCIAL

## 2025-01-07 ENCOUNTER — OFFICE VISIT (OUTPATIENT)
Dept: OBSTETRICS AND GYNECOLOGY | Facility: CLINIC | Age: 30
End: 2025-01-07
Payer: COMMERCIAL

## 2025-01-07 VITALS
HEART RATE: 76 BPM | SYSTOLIC BLOOD PRESSURE: 118 MMHG | DIASTOLIC BLOOD PRESSURE: 76 MMHG | WEIGHT: 173.75 LBS | HEIGHT: 65 IN | TEMPERATURE: 99 F | OXYGEN SATURATION: 96 % | BODY MASS INDEX: 28.95 KG/M2

## 2025-01-07 VITALS
SYSTOLIC BLOOD PRESSURE: 116 MMHG | WEIGHT: 173.5 LBS | BODY MASS INDEX: 28.91 KG/M2 | DIASTOLIC BLOOD PRESSURE: 72 MMHG | HEIGHT: 65 IN

## 2025-01-07 DIAGNOSIS — Z30.09 ENCOUNTER FOR CONSULTATION FOR FEMALE STERILIZATION: Primary | ICD-10-CM

## 2025-01-07 DIAGNOSIS — F32.9 MAJOR DEPRESSIVE DISORDER, REMISSION STATUS UNSPECIFIED, UNSPECIFIED WHETHER RECURRENT: ICD-10-CM

## 2025-01-07 DIAGNOSIS — R51.9 NONINTRACTABLE HEADACHE, UNSPECIFIED CHRONICITY PATTERN, UNSPECIFIED HEADACHE TYPE: ICD-10-CM

## 2025-01-07 DIAGNOSIS — D49.7 PITUITARY TUMOR: ICD-10-CM

## 2025-01-07 DIAGNOSIS — F60.3 BORDERLINE PERSONALITY DISORDER: ICD-10-CM

## 2025-01-07 DIAGNOSIS — F31.32 BIPOLAR AFFECTIVE DISORDER, CURRENTLY DEPRESSED, MODERATE: ICD-10-CM

## 2025-01-07 DIAGNOSIS — F41.9 ANXIETY: Primary | ICD-10-CM

## 2025-01-07 DIAGNOSIS — Z00.00 PREVENTATIVE HEALTH CARE: ICD-10-CM

## 2025-01-07 DIAGNOSIS — D50.9 IRON DEFICIENCY ANEMIA, UNSPECIFIED IRON DEFICIENCY ANEMIA TYPE: ICD-10-CM

## 2025-01-07 PROBLEM — F32.A DEPRESSION: Status: ACTIVE | Noted: 2025-01-07

## 2025-01-07 PROBLEM — E87.6 HYPOKALEMIA: Status: ACTIVE | Noted: 2025-01-07

## 2025-01-07 PROCEDURE — 3008F BODY MASS INDEX DOCD: CPT | Mod: CPTII,S$GLB,, | Performed by: OBSTETRICS & GYNECOLOGY

## 2025-01-07 PROCEDURE — 3078F DIAST BP <80 MM HG: CPT | Mod: CPTII,S$GLB,, | Performed by: OBSTETRICS & GYNECOLOGY

## 2025-01-07 PROCEDURE — 3008F BODY MASS INDEX DOCD: CPT | Mod: CPTII,S$GLB,, | Performed by: PHYSICIAN ASSISTANT

## 2025-01-07 PROCEDURE — 1160F RVW MEDS BY RX/DR IN RCRD: CPT | Mod: CPTII,S$GLB,, | Performed by: OBSTETRICS & GYNECOLOGY

## 2025-01-07 PROCEDURE — 99213 OFFICE O/P EST LOW 20 MIN: CPT | Mod: S$GLB,,, | Performed by: OBSTETRICS & GYNECOLOGY

## 2025-01-07 PROCEDURE — 99203 OFFICE O/P NEW LOW 30 MIN: CPT | Mod: S$GLB,,, | Performed by: PHYSICIAN ASSISTANT

## 2025-01-07 PROCEDURE — 3074F SYST BP LT 130 MM HG: CPT | Mod: CPTII,S$GLB,, | Performed by: PHYSICIAN ASSISTANT

## 2025-01-07 PROCEDURE — 1159F MED LIST DOCD IN RCRD: CPT | Mod: CPTII,S$GLB,, | Performed by: PHYSICIAN ASSISTANT

## 2025-01-07 PROCEDURE — 1160F RVW MEDS BY RX/DR IN RCRD: CPT | Mod: CPTII,S$GLB,, | Performed by: PHYSICIAN ASSISTANT

## 2025-01-07 PROCEDURE — 1159F MED LIST DOCD IN RCRD: CPT | Mod: CPTII,S$GLB,, | Performed by: OBSTETRICS & GYNECOLOGY

## 2025-01-07 PROCEDURE — 99999 PR PBB SHADOW E&M-EST. PATIENT-LVL V: CPT | Mod: PBBFAC,,, | Performed by: PHYSICIAN ASSISTANT

## 2025-01-07 PROCEDURE — 3074F SYST BP LT 130 MM HG: CPT | Mod: CPTII,S$GLB,, | Performed by: OBSTETRICS & GYNECOLOGY

## 2025-01-07 PROCEDURE — 3078F DIAST BP <80 MM HG: CPT | Mod: CPTII,S$GLB,, | Performed by: PHYSICIAN ASSISTANT

## 2025-01-07 PROCEDURE — 99999 PR PBB SHADOW E&M-EST. PATIENT-LVL III: CPT | Mod: PBBFAC,,, | Performed by: OBSTETRICS & GYNECOLOGY

## 2025-01-07 NOTE — PATIENT INSTRUCTIONS
Psych Resources (updated June12, 2023)      In-network BH resources from her insurance website. SW also called [facility] (Behavioral Health Services) at [phone] and confirmed, are accepting new Medicaid pt's with or without MD referral      1. [facility]  Main Location: [address] [place] [address]  Additional Locations:   [address] [facility], [place] [address]  [address] [place] [address]  [address] [facility] [place] [address], [place] [address]  3166 [place] [address]  455 [place] [address]  35[address] [facility], [place] [address]  [address] [facility], [place] [address]  [address] [facility] [place] [address], [place] [address]  [address] [place] [address]  [address], [place] [address]  School Based Counseling Program Locations  Available for enrolled students in [place], [place], [place], [place], [place], [place], [place], [place] & [place]  Available for enrolled students in [place], [place], [place], [place], [place], [place], [place]  Available for enrolled students in [place]  Phone: [phone]  Hours of Service: Varies Based on Location  [email or web address]        2. [facility]  Main Location: [address] [facility], [place] [address]  Additional Locations:  [address] [facility] [address], [place] [address]  [address], [place] [address]  [address] [facility], [place] [address]  [address] [facility] [address], [address] [place] [address]  5266 [place] [address]  [address] [place] [address]  [address] [place] [address]  [address], [place] [address]  [address], [place] [address]  901 [place] [address]  Phone: [phone]  Hours of Service: Varies Based on Location  [email or web address]     3. [facility]  [facility]  Main Location: [address], [place] [address]  Additional Locations:  [address] [place] [address]  [address] [place] [address]  [address] [place] [address]  [address] [facility], [place] [address]  Phone: [phone]  Hours of Service: Varies Based on Location  [email or web address]           4. [facility]  [address]   [place] [address]    [email or web address]     5. [facility]  [address] [place] [address]       6. [facility]  [address] [facility] [place] [address]    [email or web address]     7. [facility]  Main Location: [address] [place] [address]  Additional Locations:  [address] [facility], [address], [place] [address]  [address] [facility], [address], [place] [address]  54[address] [place] [address]  1401 [place] [address]  [address] [place] [address]  [address] [place] [address]  8585 [place] [address]  7373 [place] [address]  100 [place] [address]  8303 [place] [address]  [address] [place] [address]  [address], [address], [place] [address]  Phone: [phone]  Hours of Service: Varies Based on Location        8. [facility]         Main Location: [address] [place] [address]  Additional Locations:   [address] [facility], [place] [address]  3849 [facility], [place] [address]  [address], [place]  [address]  [address] [place]  [address]  [address] [facility] , [place]  [address]  [address]  [place], [place], [address]  [address] [facility] [address] [address] [place]  [address]  Phone: [phone]  Hours of Service: Monday - Friday 8:00 am - 5:30 pm     [email or web address]        9.   [facility]  Main Location: [address] [place] [address]  Additional Locations:   [address] [facility], [address], [place] [address]  [address] [place] [address]  [address] [place] [address]  [address], [place] [address]  [address], [place] [address]  [address] [facility] [address], [place] [address]  [address] [address]  14[address] [facility], [place] [address]  [address], [place] [address]  [address] [place] [address]  [address] [place] [address]  [address] [place] [address]  [address] [facility], [place] [address]  [address], [place] [address]  [address], [place] [address]  [address] [place] [address]  [address]75 [place] [address]  [address] [facility] [address], [place] [address]  [address] [facility], [place] [address]  501 [facility] [place] [address]  [address] [place] [address]  [address] [place] [address]  [address] [facility] [address], [place] [address]  [address] [place] [address]  [address], [place] [address]  [address] [place] [address]  Phone: [phone]

## 2025-01-07 NOTE — PROGRESS NOTES
"Subjective:      Patient ID: Zoila Sheldon is a 29 y.o. female.    Chief Complaint: Anxiety and Depression    Patient is new to clinic, being seen today for anxiety/depression.   H/o bipolar, BPD, anxiety, depression  Off medication since 2021 when she became pregnant, does not recall what she was taking at that time   Admits she is suffering with anxiety and depression currently, denies self harm  Previously completed therapy but did not find it helpful     Also feels she may have ADHD, strong family history   Reports trouble focusing/concentrating, also with periods of hyperactivity but she is not sure if this is related to BPD/bipolar     Family lives locally  She does have a close friend she is close with that she checks in with regularly   Mom stays with her and her 4 children     H/o pituitary tumor discovered Nov 2020 via MRI which was obtained d/t frequent HAs, reports previous CT did not pick it up  CT Oct 2023 "No definite abnormality in this patient with a history of pituitary tumor, although contrast-enhanced MRI would provide a more sensitive evaluation."  She reports she followed up for a few visits with specialists and was told it would need to be removed at some point but never returned   Reports ongoing chronic HAs       Review of Systems   Constitutional:  Positive for appetite change (decreased). Negative for chills, diaphoresis and fever.   HENT:  Negative for congestion, rhinorrhea and sore throat.    Eyes:  Negative for visual disturbance.   Respiratory:  Negative for cough, shortness of breath and wheezing.    Gastrointestinal:  Negative for abdominal pain, constipation, diarrhea, nausea and vomiting.   Skin:  Negative for rash.   Neurological:  Positive for headaches. Negative for dizziness and light-headedness.   Psychiatric/Behavioral:  Positive for decreased concentration and sleep disturbance. Negative for self-injury and suicidal ideas. The patient is nervous/anxious.     " "   Objective:   /76   Pulse 76   Temp 98.6 °F (37 °C) (Tympanic)   Ht 5' 5" (1.651 m)   Wt 78.8 kg (173 lb 11.6 oz)   LMP 01/05/2025 (Exact Date)   SpO2 96%   BMI 28.91 kg/m²   Physical Exam  Constitutional:       General: She is not in acute distress.     Appearance: Normal appearance. She is well-developed. She is not ill-appearing.   HENT:      Head: Normocephalic and atraumatic.   Cardiovascular:      Rate and Rhythm: Normal rate and regular rhythm.      Heart sounds: Normal heart sounds. No murmur heard.  Pulmonary:      Effort: Pulmonary effort is normal. No respiratory distress.      Breath sounds: Normal breath sounds. No decreased breath sounds.   Musculoskeletal:      Right lower leg: No edema.      Left lower leg: No edema.   Skin:     General: Skin is warm and dry.      Findings: No rash.   Psychiatric:         Speech: Speech normal.         Behavior: Behavior normal.         Thought Content: Thought content normal.       Assessment:      1. Anxiety    2. Major depressive disorder, remission status unspecified, unspecified whether recurrent    3. Bipolar affective disorder, currently depressed, moderate    4. Borderline personality disorder    5. Preventative health care    6. Pituitary tumor    7. Iron deficiency anemia, unspecified iron deficiency anemia type    8. Nonintractable headache, unspecified chronicity pattern, unspecified headache type       Plan:   Anxiety    Major depressive disorder, remission status unspecified, unspecified whether recurrent  -     Ambulatory referral/consult to Psychiatry; Future; Expected date: 01/14/2025    Bipolar affective disorder, currently depressed, moderate  -     Ambulatory referral/consult to Psychiatry; Future; Expected date: 01/14/2025    Borderline personality disorder  -     Ambulatory referral/consult to Psychiatry; Future; Expected date: 01/14/2025    Preventative health care  -     CBC Auto Differential; Future; Expected date: " 01/07/2025  -     Comprehensive Metabolic Panel; Future; Expected date: 01/07/2025  -     Hemoglobin A1C; Future; Expected date: 01/07/2025  -     Lipid Panel; Future; Expected date: 01/07/2025  -     TSH; Future; Expected date: 01/07/2025    Pituitary tumor  -     Ambulatory referral/consult to Endocrinology; Future; Expected date: 01/14/2025  -     Ambulatory referral/consult to Neurology; Future; Expected date: 01/14/2025    Iron deficiency anemia, unspecified iron deficiency anemia type  -     Iron and TIBC; Future; Expected date: 01/07/2025  -     Ferritin; Future; Expected date: 01/07/2025    Nonintractable headache, unspecified chronicity pattern, unspecified headache type  -     Ambulatory referral/consult to Neurology; Future; Expected date: 01/14/2025      Declines therapy referral    Fasting labs and f/u w PCP for annual     THOM Neurosurgeon, Endo, Neurology, Psych (Dr. Gary)   She will find previous specialists so we can request records and refer appropriately     She can also reach out to insurance for list of external providers that may have sooner availability

## 2025-01-07 NOTE — PROGRESS NOTES
Subjective:       Patient ID: Zoila Sheldon is a 29 y.o. female.    Chief Complaint:  Consult (Tubal Ligation Consultation/)      History of Present Illness  HPI  Desires surgical sterilization   Discussed surgery options after review of the medical record   Recommend bilateral salpingectomy     I have explained the risks, benefits , and alternatives of laparoscopic bilateral salpingectomy  in detail.  The patient voices understanding and all questions have been answered.  The patient agrees to proceed as planned.  Consent forms for the procedure have been reviewed and signed by the patient.     Health Maintenance   Topic Date Due    TETANUS VACCINE  10/18/2017    Pap Smear  Never done    Influenza Vaccine (1) 2024    COVID-19 Vaccine ( - - season) Never done    RSV Vaccine (Age 60+ and Pregnant patients) (1 - 1-dose 75+ series) 2071    Hepatitis C Screening  Completed    HIV Screening  Completed    Lipid Panel  Completed    Pneumococcal Vaccines (Age 0-49)  Aged Out     GYN & OB History  Patient's last menstrual period was 2025 (exact date).   Date of Last Pap: No result found    OB History    Para Term  AB Living   7 4   4 3 4   SAB IAB Ectopic Multiple Live Births   0 2     4      # Outcome Date GA Lbr [place]/2nd Weight Sex Type Anes PTL Lv   7   33w0d   M  EPI  [facility]      Complications: Hypertension   6 Molar  25w0d             Complications: Preeclampsia   5 IAB            4   36w0d   M Vag-Spont None  [facility]      Complications: Encounter for blood transfusion   3   35w0d   M Vag-Spont EPI  [facility]   2 IAB            1   35w0d   F Vag-Spont None  [facility]     Past Medical History:   Diagnosis Date    Pituitary tumor      History reviewed. No pertinent surgical history.  No family history on file.  Social History     Tobacco Use    Smoking status: Never    Smokeless tobacco: Never   Substance Use Topics    Alcohol use: Not Currently    Drug use:  "Not Currently     (Not in a hospital admission)    Review of patient's allergies indicates:   Allergen Reactions    Lactase Diarrhea    Milk containing products (dairy) Diarrhea and Nausea And Vomiting     No current outpatient medications on file.     No current facility-administered medications for this visit.       Review of Systems  Review of Systems        Objective:   /72   Ht 5' 5" (1.651 m)   Wt 78.7 kg (173 lb 8 oz)   LMP 01/05/2025 (Exact Date)   BMI 28.87 kg/m²    Physical Exam     Assessment:        1. Encounter for consultation for female sterilization                Plan:            Zoila was seen today for consult.    Diagnoses and all orders for this visit:    Encounter for consultation for female sterilization    Schedule laparoscopy with bilateral salpingectomy     "

## 2025-01-07 NOTE — TELEPHONE ENCOUNTER
Attempted to contact pt, no answer, left message offering sooner appt time, and to contact office to cancel, reschedule, or confirm.

## 2025-01-08 ENCOUNTER — TELEPHONE (OUTPATIENT)
Dept: OBSTETRICS AND GYNECOLOGY | Facility: CLINIC | Age: 30
End: 2025-01-08
Payer: COMMERCIAL

## 2025-01-08 NOTE — TELEPHONE ENCOUNTER
Contacted pt, verified 2 pt identifiers, informed pt BTL Medicaid consent will need to be signed in office. Pt will arrive 1/9/25 at [facility] location during lunch hours to sign consent.

## 2025-01-08 NOTE — TELEPHONE ENCOUNTER
----- Message from Nurse Merly sent at 1/8/2025 11:53 AM CST -----  Regarding: FW: Medical Advice  Contact: Zoila Buck Pt returning your call       Alicja  ----- Message -----  From: Stephanie Pool  Sent: 1/8/2025  10:59 AM CST  To: Nicolette Diaz Staff  Subject: Medical Advice                                   Type:  Patient Returning Call    Who Called: Zoila   Who Left Message for Patient:Cielo Sutton MA  Does the patient know what this is regarding?:  Would the patient rather a call back or a response via [facility]?   Best Call Back Number: [phone] (home)    Additional Information:  Zoila is requesting a callback from the nurse today in regard to the paperwork.

## 2025-01-08 NOTE — TELEPHONE ENCOUNTER
----- Message from DEBBIE Will MD sent at 1/7/2025 11:24 AM CST -----  Schedule laparoscopy with bilateral salpingectomy   [place] ok   Consents signed

## 2025-01-14 ENCOUNTER — TELEPHONE (OUTPATIENT)
Dept: OBSTETRICS AND GYNECOLOGY | Facility: CLINIC | Age: 30
End: 2025-01-14
Payer: COMMERCIAL

## 2025-01-14 NOTE — TELEPHONE ENCOUNTER
----- Message from Raj sent at 1/14/2025 10:41 AM CST -----  Type: Needs Medical Advice  Who Called:  pt  Best Call Back Number: [phone]  Additional Information: pt is calling the office to let the Dr know all her consent forms have been filled out and she is just waiting to hear back about getting her procedure scheduled. Please call back to advise. Thanks!

## 2025-01-15 NOTE — TELEPHONE ENCOUNTER
Contacted pt, verified 2 pt identifiers, informed pt her medicaid tubal consent has been signed by provider and surgery scheduler should be giving her a call by end of day, pt cem.

## 2025-01-16 ENCOUNTER — TELEPHONE (OUTPATIENT)
Dept: OBSTETRICS AND GYNECOLOGY | Facility: CLINIC | Age: 30
End: 2025-01-16
Payer: COMMERCIAL

## 2025-01-16 DIAGNOSIS — Z30.2 ENCOUNTER FOR STERILIZATION: Primary | ICD-10-CM

## 2025-01-16 NOTE — TELEPHONE ENCOUNTER
----- Message from Radha sent at 1/16/2025 12:12 PM CST -----  Contact: pt  Type:  Patient Advice     Who Called:pt     Does the patient know what this is regarding?:Procedure and medications   Would the patient rather a call back or a response via [facility]? Call   Best Call Back Number: [phone]  Additional Information:

## 2025-01-16 NOTE — TELEPHONE ENCOUNTER
----- Message from Nurse Merly sent at 1/8/2025  2:25 PM CST -----  Contact: Zoila  Good Afternoon!     Pt called back her phone was on DND    Please Advise  Thank you!   Alicja  ----- Message -----  From: Ana Maria Guidry  Sent: 1/8/2025   2:09 PM CST  To: Nicolette Diaz Staff    .Type:  Patient Returning Call    Who Called: Zoila   Who Left Message for Patient:Antoniohermes  Does the patient know what this is regarding?:tubal scheduling/ pts phone was on dnt   Would the patient rather a call back or a response via [facility]?  Call   Best Call Back Number: .[phone]  Additional Information:

## 2025-01-16 NOTE — TELEPHONE ENCOUNTER
DEBBIE Will MD Hebert, Jeanetta K, LPN  Caller: Unspecified (Today,  1:33 PM)  No     Called patient and advised per RD she does not have to stop supplement, creatine.  Patient verbalized understanding.

## 2025-01-16 NOTE — TELEPHONE ENCOUNTER
Called patient and she is taking Creatine supplement.  Does she need to stop taking prior to surgery?
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
## 2025-01-28 ENCOUNTER — OFFICE VISIT (OUTPATIENT)
Dept: PSYCHIATRY | Facility: CLINIC | Age: 30
End: 2025-01-28
Payer: COMMERCIAL

## 2025-01-28 ENCOUNTER — TELEPHONE (OUTPATIENT)
Dept: PSYCHIATRY | Facility: CLINIC | Age: 30
End: 2025-01-28
Payer: COMMERCIAL

## 2025-01-28 DIAGNOSIS — F60.3 BORDERLINE PERSONALITY DISORDER: ICD-10-CM

## 2025-01-28 DIAGNOSIS — F41.0 GENERALIZED ANXIETY DISORDER WITH PANIC ATTACKS: ICD-10-CM

## 2025-01-28 DIAGNOSIS — F32.9 MAJOR DEPRESSIVE DISORDER, REMISSION STATUS UNSPECIFIED, UNSPECIFIED WHETHER RECURRENT: ICD-10-CM

## 2025-01-28 DIAGNOSIS — F41.1 GENERALIZED ANXIETY DISORDER WITH PANIC ATTACKS: ICD-10-CM

## 2025-01-28 DIAGNOSIS — F31.32 BIPOLAR AFFECTIVE DISORDER, CURRENTLY DEPRESSED, MODERATE: Primary | ICD-10-CM

## 2025-01-28 DIAGNOSIS — F43.10 POST TRAUMATIC STRESS DISORDER (PTSD): ICD-10-CM

## 2025-01-28 NOTE — TELEPHONE ENCOUNTER
----- Message from Mónica sent at 1/28/2025  3:19 PM CST -----  Contact: WILNER JORDAN [[id number]]  .Type:  Patient Requesting Call    Who Called:WILNER JORDAN [[id number]]  Does the patient know what this is regarding?:Patient requesting a call back to confirm pharmacy for medication  Would the patient rather a call back or a response via [facility]? 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

## 2025-01-29 ENCOUNTER — TELEPHONE (OUTPATIENT)
Dept: PSYCHIATRY | Facility: CLINIC | Age: 30
End: 2025-01-29
Payer: COMMERCIAL

## 2025-01-29 ENCOUNTER — PATIENT MESSAGE (OUTPATIENT)
Dept: OBSTETRICS AND GYNECOLOGY | Facility: CLINIC | Age: 30
End: 2025-01-29
Payer: COMMERCIAL

## 2025-01-29 ENCOUNTER — PATIENT MESSAGE (OUTPATIENT)
Dept: PSYCHIATRY | Facility: CLINIC | Age: 30
End: 2025-01-29
Payer: COMMERCIAL

## 2025-01-29 RX ORDER — LAMOTRIGINE 25 MG/1
TABLET ORAL
Qty: 75 TABLET | Refills: 0 | Status: SHIPPED | OUTPATIENT
Start: 2025-01-29

## 2025-01-29 RX ORDER — LAMOTRIGINE 25 MG/1
TABLET ORAL
Qty: 75 TABLET | Refills: 0 | Status: SHIPPED | OUTPATIENT
Start: 2025-01-29 | End: 2025-01-29

## 2025-01-29 RX ORDER — CLONAZEPAM 0.5 MG/1
0.25 TABLET ORAL 2 TIMES DAILY PRN
Qty: 30 TABLET | Refills: 2 | Status: SHIPPED | OUTPATIENT
Start: 2025-01-29 | End: 2025-04-29

## 2025-01-29 NOTE — TELEPHONE ENCOUNTER
----- Message from Maylin sent at 1/29/2025 10:39 AM CST -----  Contact: Patient, [phone]  Calling again regarding prescriptions are still not at the pharmacy. Please call her. Thanks.

## 2025-01-30 ENCOUNTER — LAB VISIT (OUTPATIENT)
Dept: LAB | Facility: HOSPITAL | Age: 30
End: 2025-01-30
Attending: PHYSICIAN ASSISTANT
Payer: COMMERCIAL

## 2025-01-30 DIAGNOSIS — Z00.00 PREVENTATIVE HEALTH CARE: ICD-10-CM

## 2025-01-30 DIAGNOSIS — D50.9 IRON DEFICIENCY ANEMIA, UNSPECIFIED IRON DEFICIENCY ANEMIA TYPE: ICD-10-CM

## 2025-01-30 LAB
ALBUMIN SERPL BCP-MCNC: 3.9 G/DL (ref 3.5–5.2)
ALP SERPL-CCNC: 58 U/L (ref 40–150)
ALT SERPL W/O P-5'-P-CCNC: 15 U/L (ref 10–44)
ANION GAP SERPL CALC-SCNC: 10 MMOL/L (ref 8–16)
AST SERPL-CCNC: 18 U/L (ref 10–40)
BASOPHILS # BLD AUTO: 0.06 K/UL (ref 0–0.2)
BASOPHILS NFR BLD: 1 % (ref 0–1.9)
BILIRUB SERPL-MCNC: 0.4 MG/DL (ref 0.1–1)
BUN SERPL-MCNC: 10 MG/DL (ref 6–20)
CALCIUM SERPL-MCNC: 8.9 MG/DL (ref 8.7–10.5)
CHLORIDE SERPL-SCNC: 109 MMOL/L (ref 95–110)
CHOLEST SERPL-MCNC: 137 MG/DL (ref 120–199)
CHOLEST/HDLC SERPL: 3.8 {RATIO} (ref 2–5)
CO2 SERPL-SCNC: 20 MMOL/L (ref 23–29)
CREAT SERPL-MCNC: 0.8 MG/DL (ref 0.5–1.4)
DIFFERENTIAL METHOD BLD: ABNORMAL
EOSINOPHIL # BLD AUTO: 0.1 K/UL (ref 0–0.5)
EOSINOPHIL NFR BLD: 0.8 % (ref 0–8)
ERYTHROCYTE [DISTWIDTH] IN BLOOD BY AUTOMATED COUNT: 17.2 % (ref 11.5–14.5)
EST. GFR  (NO RACE VARIABLE): >60 ML/MIN/1.73 M^2
ESTIMATED AVG GLUCOSE: 94 MG/DL (ref 68–131)
FERRITIN SERPL-MCNC: 6 NG/ML (ref 20–300)
GLUCOSE SERPL-MCNC: 84 MG/DL (ref 70–110)
HBA1C MFR BLD: 4.9 % (ref 4–5.6)
HCT VFR BLD AUTO: 32.2 % (ref 37–48.5)
HDLC SERPL-MCNC: 36 MG/DL (ref 40–75)
HDLC SERPL: 26.3 % (ref 20–50)
HGB BLD-MCNC: 9.8 G/DL (ref 12–16)
IMM GRANULOCYTES # BLD AUTO: 0.01 K/UL (ref 0–0.04)
IMM GRANULOCYTES NFR BLD AUTO: 0.2 % (ref 0–0.5)
IRON SERPL-MCNC: 44 UG/DL (ref 30–160)
LDLC SERPL CALC-MCNC: 87.6 MG/DL (ref 63–159)
LYMPHOCYTES # BLD AUTO: 1.4 K/UL (ref 1–4.8)
LYMPHOCYTES NFR BLD: 22.9 % (ref 18–48)
MCH RBC QN AUTO: 25.4 PG (ref 27–31)
MCHC RBC AUTO-ENTMCNC: 30.4 G/DL (ref 32–36)
MCV RBC AUTO: 83 FL (ref 82–98)
MONOCYTES # BLD AUTO: 0.4 K/UL (ref 0.3–1)
MONOCYTES NFR BLD: 6.5 % (ref 4–15)
NEUTROPHILS # BLD AUTO: 4.3 K/UL (ref 1.8–7.7)
NEUTROPHILS NFR BLD: 68.6 % (ref 38–73)
NONHDLC SERPL-MCNC: 101 MG/DL
NRBC BLD-RTO: 0 /100 WBC
PLATELET # BLD AUTO: 441 K/UL (ref 150–450)
PMV BLD AUTO: 10.4 FL (ref 9.2–12.9)
POTASSIUM SERPL-SCNC: 4.2 MMOL/L (ref 3.5–5.1)
PROT SERPL-MCNC: 7.5 G/DL (ref 6–8.4)
RBC # BLD AUTO: 3.86 M/UL (ref 4–5.4)
SATURATED IRON: 10 % (ref 20–50)
SODIUM SERPL-SCNC: 139 MMOL/L (ref 136–145)
TOTAL IRON BINDING CAPACITY: 448 UG/DL (ref 250–450)
TRANSFERRIN SERPL-MCNC: 303 MG/DL (ref 200–375)
TRIGL SERPL-MCNC: 67 MG/DL (ref 30–150)
TSH SERPL DL<=0.005 MIU/L-ACNC: 0.67 UIU/ML (ref 0.4–4)
WBC # BLD AUTO: 6.2 K/UL (ref 3.9–12.7)

## 2025-01-30 PROCEDURE — 85025 COMPLETE CBC W/AUTO DIFF WBC: CPT | Performed by: PHYSICIAN ASSISTANT

## 2025-01-30 PROCEDURE — 83540 ASSAY OF IRON: CPT | Performed by: PHYSICIAN ASSISTANT

## 2025-01-30 PROCEDURE — 80061 LIPID PANEL: CPT | Performed by: PHYSICIAN ASSISTANT

## 2025-01-30 PROCEDURE — 84443 ASSAY THYROID STIM HORMONE: CPT | Performed by: PHYSICIAN ASSISTANT

## 2025-01-30 PROCEDURE — 83036 HEMOGLOBIN GLYCOSYLATED A1C: CPT | Performed by: PHYSICIAN ASSISTANT

## 2025-01-30 PROCEDURE — 82728 ASSAY OF FERRITIN: CPT | Performed by: PHYSICIAN ASSISTANT

## 2025-01-30 PROCEDURE — 80053 COMPREHEN METABOLIC PANEL: CPT | Performed by: PHYSICIAN ASSISTANT

## 2025-01-30 PROCEDURE — 36415 COLL VENOUS BLD VENIPUNCTURE: CPT | Performed by: PHYSICIAN ASSISTANT

## 2025-02-06 ENCOUNTER — LAB VISIT (OUTPATIENT)
Dept: LAB | Facility: HOSPITAL | Age: 30
End: 2025-02-06
Attending: FAMILY MEDICINE
Payer: COMMERCIAL

## 2025-02-06 ENCOUNTER — OFFICE VISIT (OUTPATIENT)
Dept: INTERNAL MEDICINE | Facility: CLINIC | Age: 30
End: 2025-02-06
Payer: COMMERCIAL

## 2025-02-06 VITALS
OXYGEN SATURATION: 98 % | HEART RATE: 104 BPM | DIASTOLIC BLOOD PRESSURE: 70 MMHG | HEIGHT: 65 IN | BODY MASS INDEX: 27.88 KG/M2 | SYSTOLIC BLOOD PRESSURE: 132 MMHG | WEIGHT: 167.31 LBS

## 2025-02-06 DIAGNOSIS — D50.9 IRON DEFICIENCY ANEMIA, UNSPECIFIED IRON DEFICIENCY ANEMIA TYPE: ICD-10-CM

## 2025-02-06 DIAGNOSIS — Z87.898 HISTORY OF PITUITARY TUMOR: ICD-10-CM

## 2025-02-06 DIAGNOSIS — F31.32 BIPOLAR AFFECTIVE DISORDER, CURRENTLY DEPRESSED, MODERATE: ICD-10-CM

## 2025-02-06 DIAGNOSIS — F43.10 PTSD (POST-TRAUMATIC STRESS DISORDER): ICD-10-CM

## 2025-02-06 DIAGNOSIS — F41.1 GAD (GENERALIZED ANXIETY DISORDER): ICD-10-CM

## 2025-02-06 DIAGNOSIS — Z87.898 HISTORY OF PITUITARY TUMOR: Primary | ICD-10-CM

## 2025-02-06 LAB
FSH SERPL-ACNC: 6.14 MIU/ML
LH SERPL-ACNC: 6.4 MIU/ML
PROLACTIN SERPL IA-MCNC: 9.3 NG/ML (ref 5.2–26.5)

## 2025-02-06 PROCEDURE — 83001 ASSAY OF GONADOTROPIN (FSH): CPT | Performed by: FAMILY MEDICINE

## 2025-02-06 PROCEDURE — 82397 CHEMILUMINESCENT ASSAY: CPT | Performed by: FAMILY MEDICINE

## 2025-02-06 PROCEDURE — 99999 PR PBB SHADOW E&M-EST. PATIENT-LVL IV: CPT | Mod: PBBFAC,,, | Performed by: FAMILY MEDICINE

## 2025-02-06 PROCEDURE — 84146 ASSAY OF PROLACTIN: CPT | Performed by: FAMILY MEDICINE

## 2025-02-06 PROCEDURE — 84305 ASSAY OF SOMATOMEDIN: CPT | Performed by: FAMILY MEDICINE

## 2025-02-06 PROCEDURE — 83002 ASSAY OF GONADOTROPIN (LH): CPT | Performed by: FAMILY MEDICINE

## 2025-02-06 PROCEDURE — 36415 COLL VENOUS BLD VENIPUNCTURE: CPT | Performed by: FAMILY MEDICINE

## 2025-02-06 PROCEDURE — 82024 ASSAY OF ACTH: CPT | Performed by: FAMILY MEDICINE

## 2025-02-06 RX ORDER — FERROUS GLUCONATE 324(38)MG
324 TABLET ORAL
COMMUNITY
Start: 2025-02-06 | End: 2026-02-06

## 2025-02-07 ENCOUNTER — PATIENT MESSAGE (OUTPATIENT)
Dept: INTERNAL MEDICINE | Facility: CLINIC | Age: 30
End: 2025-02-07
Payer: COMMERCIAL

## 2025-02-07 ENCOUNTER — TELEPHONE (OUTPATIENT)
Dept: INTERNAL MEDICINE | Facility: CLINIC | Age: 30
End: 2025-02-07
Payer: COMMERCIAL

## 2025-02-07 LAB — ACTH PLAS-MCNC: 8 PG/ML (ref 0–46)

## 2025-02-07 NOTE — TELEPHONE ENCOUNTER
----- Message from Dominique sent at 2/7/2025 12:58 PM CST -----  Type:  Needs Medical Advice    Who Called: WILNER JORDAN [[id number]]  Symptoms (please be specific):    How long has patient had these symptoms:    Pharmacy name and phone #:    Would the patient rather a call back or a response via [facility]?   Best Call Back Number:  [phone]  Additional Information: Patient had question regarding lab that was taken on 02-06. Please call

## 2025-02-09 ENCOUNTER — HOSPITAL ENCOUNTER (EMERGENCY)
Facility: HOSPITAL | Age: 30
Discharge: HOME | End: 2025-02-09
Attending: EMERGENCY MEDICINE
Payer: COMMERCIAL

## 2025-02-09 VITALS
BODY MASS INDEX: 28.29 KG/M2 | SYSTOLIC BLOOD PRESSURE: 117 MMHG | WEIGHT: 169.81 LBS | HEIGHT: 65 IN | DIASTOLIC BLOOD PRESSURE: 63 MMHG | HEART RATE: 81 BPM | RESPIRATION RATE: 20 BRPM | OXYGEN SATURATION: 99 % | TEMPERATURE: 98 F

## 2025-02-09 DIAGNOSIS — F41.9 ANXIETY: ICD-10-CM

## 2025-02-09 DIAGNOSIS — R55 SYNCOPE, UNSPECIFIED SYNCOPE TYPE: Primary | ICD-10-CM

## 2025-02-09 DIAGNOSIS — R55 SYNCOPE: ICD-10-CM

## 2025-02-09 DIAGNOSIS — D49.7 PITUITARY TUMOR: ICD-10-CM

## 2025-02-09 DIAGNOSIS — R51.9 ACUTE NONINTRACTABLE HEADACHE, UNSPECIFIED HEADACHE TYPE: ICD-10-CM

## 2025-02-09 DIAGNOSIS — R55 NEAR SYNCOPE: ICD-10-CM

## 2025-02-09 LAB
ALBUMIN SERPL BCP-MCNC: 3.9 G/DL (ref 3.5–5.2)
ALP SERPL-CCNC: 65 U/L (ref 40–150)
ALT SERPL W/O P-5'-P-CCNC: 13 U/L (ref 10–44)
ANION GAP SERPL CALC-SCNC: 5 MMOL/L (ref 8–16)
AST SERPL-CCNC: 17 U/L (ref 10–40)
B-HCG UR QL: NEGATIVE
BACTERIA #/AREA URNS HPF: NORMAL /HPF
BASOPHILS # BLD AUTO: 0.03 K/UL (ref 0–0.2)
BASOPHILS NFR BLD: 0.8 % (ref 0–1.9)
BILIRUB SERPL-MCNC: 0.2 MG/DL (ref 0.1–1)
BILIRUB UR QL STRIP: NEGATIVE
BUN SERPL-MCNC: 9 MG/DL (ref 6–20)
CALCIUM SERPL-MCNC: 8.9 MG/DL (ref 8.7–10.5)
CHLORIDE SERPL-SCNC: 107 MMOL/L (ref 95–110)
CLARITY UR: CLEAR
CO2 SERPL-SCNC: 25 MMOL/L (ref 23–29)
COLOR UR: COLORLESS
CORTIS SERPL-MCNC: 6.6 UG/DL
CREAT SERPL-MCNC: 1 MG/DL (ref 0.5–1.4)
DIFFERENTIAL METHOD BLD: ABNORMAL
EOSINOPHIL # BLD AUTO: 0.1 K/UL (ref 0–0.5)
EOSINOPHIL NFR BLD: 1.6 % (ref 0–8)
ERYTHROCYTE [DISTWIDTH] IN BLOOD BY AUTOMATED COUNT: 16.5 % (ref 11.5–14.5)
EST. GFR  (NO RACE VARIABLE): >60 ML/MIN/1.73 M^2
GLUCOSE SERPL-MCNC: 87 MG/DL (ref 70–110)
GLUCOSE UR QL STRIP: NEGATIVE
HCT VFR BLD AUTO: 32 % (ref 37–48.5)
HGB BLD-MCNC: 9.8 G/DL (ref 12–16)
HGB UR QL STRIP: NEGATIVE
IMM GRANULOCYTES # BLD AUTO: 0.01 K/UL (ref 0–0.04)
IMM GRANULOCYTES NFR BLD AUTO: 0.3 % (ref 0–0.5)
KETONES UR QL STRIP: NEGATIVE
LEUKOCYTE ESTERASE UR QL STRIP: ABNORMAL
LYMPHOCYTES # BLD AUTO: 1 K/UL (ref 1–4.8)
LYMPHOCYTES NFR BLD: 27.2 % (ref 18–48)
MCH RBC QN AUTO: 25.7 PG (ref 27–31)
MCHC RBC AUTO-ENTMCNC: 30.6 G/DL (ref 32–36)
MCV RBC AUTO: 84 FL (ref 82–98)
MICROSCOPIC COMMENT: NORMAL
MONOCYTES # BLD AUTO: 0.4 K/UL (ref 0.3–1)
MONOCYTES NFR BLD: 11.5 % (ref 4–15)
NEUTROPHILS # BLD AUTO: 2.2 K/UL (ref 1.8–7.7)
NEUTROPHILS NFR BLD: 58.6 % (ref 38–73)
NITRITE UR QL STRIP: NEGATIVE
NRBC BLD-RTO: 0 /100 WBC
PH UR STRIP: 8 [PH] (ref 5–8)
PLATELET # BLD AUTO: 399 K/UL (ref 150–450)
PMV BLD AUTO: 9.6 FL (ref 9.2–12.9)
POTASSIUM SERPL-SCNC: 3.8 MMOL/L (ref 3.5–5.1)
PROT SERPL-MCNC: 7.1 G/DL (ref 6–8.4)
PROT UR QL STRIP: NEGATIVE
RBC # BLD AUTO: 3.81 M/UL (ref 4–5.4)
RBC #/AREA URNS HPF: 1 /HPF (ref 0–4)
SODIUM SERPL-SCNC: 137 MMOL/L (ref 136–145)
SP GR UR STRIP: 1.01 (ref 1–1.03)
SQUAMOUS #/AREA URNS HPF: 2 /HPF
TROPONIN I SERPL DL<=0.01 NG/ML-MCNC: <0.006 NG/ML (ref 0–0.03)
URN SPEC COLLECT METH UR: ABNORMAL
UROBILINOGEN UR STRIP-ACNC: NEGATIVE EU/DL
WBC # BLD AUTO: 3.82 K/UL (ref 3.9–12.7)
WBC #/AREA URNS HPF: 2 /HPF (ref 0–5)

## 2025-02-09 PROCEDURE — 93005 ELECTROCARDIOGRAM TRACING: CPT

## 2025-02-09 PROCEDURE — 99285 EMERGENCY DEPT VISIT HI MDM: CPT | Mod: 25

## 2025-02-09 PROCEDURE — 84484 ASSAY OF TROPONIN QUANT: CPT | Performed by: EMERGENCY MEDICINE

## 2025-02-09 PROCEDURE — 82533 TOTAL CORTISOL: CPT | Performed by: EMERGENCY MEDICINE

## 2025-02-09 PROCEDURE — 81025 URINE PREGNANCY TEST: CPT | Performed by: EMERGENCY MEDICINE

## 2025-02-09 PROCEDURE — 80053 COMPREHEN METABOLIC PANEL: CPT | Performed by: EMERGENCY MEDICINE

## 2025-02-09 PROCEDURE — 81000 URINALYSIS NONAUTO W/SCOPE: CPT | Performed by: EMERGENCY MEDICINE

## 2025-02-09 PROCEDURE — 36415 COLL VENOUS BLD VENIPUNCTURE: CPT | Performed by: EMERGENCY MEDICINE

## 2025-02-09 PROCEDURE — 25000003 PHARM REV CODE 250: Performed by: EMERGENCY MEDICINE

## 2025-02-09 PROCEDURE — 85025 COMPLETE CBC W/AUTO DIFF WBC: CPT | Performed by: EMERGENCY MEDICINE

## 2025-02-09 PROCEDURE — 93010 ELECTROCARDIOGRAM REPORT: CPT | Mod: ,,, | Performed by: INTERNAL MEDICINE

## 2025-02-09 RX ORDER — ACETAMINOPHEN 500 MG
1000 TABLET ORAL
Status: COMPLETED | OUTPATIENT
Start: 2025-02-09 | End: 2025-02-09

## 2025-02-09 RX ADMIN — ACETAMINOPHEN 1000 MG: 500 TABLET ORAL at 11:02

## 2025-02-09 NOTE — ED PROVIDER NOTES
SCRIBE #1 NOTE: I, Kina Lawson and Edwige Luis, am scribing for, and in the presence of, Gabriel Rizvi Jr., MD. I have scribed the entire note.       History     Chief Complaint   Patient presents with    Loss of Consciousness     Patient presents to ED with c/o fainting today, vomiting x 3. Patient has history of Pituitary tumor and was checking her pulse at home. Patient states her pulse was between 120-140s.      Review of patient's allergies indicates:   Allergen Reactions    Lactase Diarrhea    Milk containing products (dairy) Diarrhea and Nausea And Vomiting         History of Present Illness     HPI    2025, 10:05 AM  History obtained from the patient and medical records      History of Present Illness: Zoila Sheldon is a 29 y.o. female patient with a PMHx of pituitary tumor and anemia who presents to the Emergency Department for evaluation after a syncope episode which occurred earlier today. Pt reports she fell and hit her head when she loss consciousness. Pt c/o a right sided headache. Pt states her headaches for the pituitary tumor are usually left sided. Symptoms are constant and moderate in severity. No mitigating or exacerbating factors reported. Associated sxs include N/V, dizziness, weakness, and palpitations. Pt reports her pulse was high when she checked it at home. Patient denies any fever, vision changes, chest pain, SOB, abdominal pain, or dizziness. No prior Tx specified.  No further complaints or concerns at this time.       Arrival mode: Personal Transportation    PCP: No primary care provider on file.        Past Medical History:  Past Medical History:   Diagnosis Date    Anemia, unspecified     Anxiety disorder, unspecified     Bipolar disorder, unspecified     Borderline personality disorder     Depression     Pituitary tumor        Past Surgical History:  Past Surgical History:   Procedure Laterality Date    INDUCED       Surgical    Riggins teeth  surgery           Family History:  Family History   Problem Relation Name Age of Onset    Depression Mother      Anxiety Mother      ADD / ADHD Mother      Bipolar disorder Father      ADD / ADHD Sister      Prostate cancer Maternal Grandfather      Colon cancer Paternal Grandfather         Social History:  Social History     Tobacco Use    Smoking status: Never    Smokeless tobacco: Never   Substance and Sexual Activity    Alcohol use: Not Currently    Drug use: Not Currently     Types: Marijuana     Comment: 1gm/wk    Sexual activity: Not Currently     Partners: Male        Review of Systems     Review of Systems   Constitutional:  Negative for chills and fever.   HENT:  Negative for congestion and sore throat.    Eyes:  Negative for visual disturbance.   Respiratory:  Negative for shortness of breath.    Cardiovascular:  Positive for palpitations. Negative for chest pain.   Gastrointestinal:  Positive for nausea and vomiting. Negative for abdominal pain.   Genitourinary:  Negative for dysuria.   Musculoskeletal:  Negative for back pain.   Skin:  Negative for rash.   Neurological:  Positive for dizziness, syncope, weakness and headaches (right sided).   Hematological:  Does not bruise/bleed easily.   All other systems reviewed and are negative.     Physical Exam     Initial Vitals [02/09/25 0942]   BP Pulse Resp Temp SpO2   120/73 97 16 98.1 °F (36.7 °C) 100 %      MAP       --          Physical Exam  Nursing Notes and Vital Signs Reviewed.  Constitutional: Patient is in no acute distress. Well-developed and well-nourished.  Head: Atraumatic. Normocephalic. Known pituitary adenoma.   Eyes: PERRL. EOM intact. Conjunctivae are not pale. No scleral icterus. No vision changes.  ENT: Mucous membranes are moist. Oropharynx is clear and symmetric.    Neck: Supple. Full ROM. No lymphadenopathy.  Cardiovascular: Regular rate. Regular rhythm. No murmurs, rubs, or gallops. Distal pulses are 2+ and  "symmetric.  Pulmonary/Chest: No respiratory distress. Clear to auscultation bilaterally. No wheezing or rales.  Abdominal: Soft and non-distended.  There is no tenderness.  No rebound, guarding, or rigidity. Good bowel sounds.  Genitourinary: No CVA tenderness.  Musculoskeletal: Moves all extremities. No obvious deformities. No edema. No calf tenderness.  Skin: Warm and dry.  Neurological:  Alert, awake, and appropriate.  Normal speech.  No acute focal neurological deficits are appreciated.  Psychiatric: Anxious. Good eye contact. Appropriate in content.     ED Course   Procedures  ED Vital Signs:  Vitals:    02/09/25 0942 02/09/25 1000 02/09/25 1015 02/09/25 1025   BP: 120/73 120/73 112/69    Pulse: 97 88 85 87   Resp: 16 19 19    Temp: 98.1 °F (36.7 °C)      TempSrc: Oral      SpO2: 100% 99% 98%    Weight: 77 kg (169 lb 12.8 oz)      Height: 5' 5" (1.651 m)       02/09/25 1030 02/09/25 1100 02/09/25 1115 02/09/25 1145   BP: 110/71 109/66 116/67 107/60   Pulse: 86 85 82 78   Resp: 18 20 20 19   Temp:       TempSrc:       SpO2: 100% 99% 100% 100%   Weight:       Height:        02/09/25 1200 02/09/25 1218   BP: 110/61 (!) 104/59   Pulse: 88 85   Resp: 20 19   Temp:     TempSrc:     SpO2: 100% 100%   Weight:     Height:         Abnormal Lab Results:  Labs Reviewed   CBC W/ AUTO DIFFERENTIAL - Abnormal       Result Value    WBC 3.82 (*)     RBC 3.81 (*)     Hemoglobin 9.8 (*)     Hematocrit 32.0 (*)     MCV 84      MCH 25.7 (*)     MCHC 30.6 (*)     RDW 16.5 (*)     Platelets 399      MPV 9.6      Immature Granulocytes 0.3      Gran # (ANC) 2.2      Immature Grans (Abs) 0.01      Lymph # 1.0      Mono # 0.4      Eos # 0.1      Baso # 0.03      nRBC 0      Gran % 58.6      Lymph % 27.2      Mono % 11.5      Eosinophil % 1.6      Basophil % 0.8      Differential Method Automated     COMPREHENSIVE METABOLIC PANEL - Abnormal    Sodium 137      Potassium 3.8      Chloride 107      CO2 25      Glucose 87      BUN 9      " Creatinine 1.0      Calcium 8.9      Total Protein 7.1      Albumin 3.9      Total Bilirubin 0.2      Alkaline Phosphatase 65      AST 17      ALT 13      eGFR >60      Anion Gap 5 (*)    URINALYSIS, REFLEX TO URINE CULTURE - Abnormal    Specimen UA Urine, Clean Catch      Color, UA Colorless (*)     Appearance, UA Clear      pH, UA 8.0      Specific Gravity, UA 1.015      Protein, UA Negative      Glucose, UA Negative      Ketones, UA Negative      Bilirubin (UA) Negative      Occult Blood UA Negative      Nitrite, UA Negative      Urobilinogen, UA Negative      Leukocytes, UA 2+ (*)     Narrative:     Specimen Source->Urine   TROPONIN I    Troponin I <0.006     PREGNANCY TEST, URINE RAPID    Preg Test, Ur Negative      Narrative:     Specimen Source->Urine   URINALYSIS MICROSCOPIC    RBC, UA 1      WBC, UA 2      Bacteria Rare      Squam Epithel, UA 2      Microscopic Comment SEE COMMENT      Narrative:     Specimen Source->Urine   CORTISOL, RANDOM        All Lab Results:  Results for orders placed or performed during the hospital encounter of 02/09/25   CBC auto differential    Collection Time: 02/09/25 10:29 AM   Result Value Ref Range    WBC 3.82 (L) 3.90 - 12.70 K/uL    RBC 3.81 (L) 4.00 - 5.40 M/uL    Hemoglobin 9.8 (L) 12.0 - 16.0 g/dL    Hematocrit 32.0 (L) 37.0 - 48.5 %    MCV 84 82 - 98 fL    MCH 25.7 (L) 27.0 - 31.0 pg    MCHC 30.6 (L) 32.0 - 36.0 g/dL    RDW 16.5 (H) 11.5 - 14.5 %    Platelets 399 150 - 450 K/uL    MPV 9.6 9.2 - 12.9 fL    Immature Granulocytes 0.3 0.0 - 0.5 %    Gran # (ANC) 2.2 1.8 - 7.7 K/uL    Immature Grans (Abs) 0.01 0.00 - 0.04 K/uL    Lymph # 1.0 1.0 - 4.8 K/uL    Mono # 0.4 0.3 - 1.0 K/uL    Eos # 0.1 0.0 - 0.5 K/uL    Baso # 0.03 0.00 - 0.20 K/uL    nRBC 0 0 /100 WBC    Gran % 58.6 38.0 - 73.0 %    Lymph % 27.2 18.0 - 48.0 %    Mono % 11.5 4.0 - 15.0 %    Eosinophil % 1.6 0.0 - 8.0 %    Basophil % 0.8 0.0 - 1.9 %    Differential Method Automated    Comprehensive metabolic  panel    Collection Time: 02/09/25 10:29 AM   Result Value Ref Range    Sodium 137 136 - 145 mmol/L    Potassium 3.8 3.5 - 5.1 mmol/L    Chloride 107 95 - 110 mmol/L    CO2 25 23 - 29 mmol/L    Glucose 87 70 - 110 mg/dL    BUN 9 6 - 20 mg/dL    Creatinine 1.0 0.5 - 1.4 mg/dL    Calcium 8.9 8.7 - 10.5 mg/dL    Total Protein 7.1 6.0 - 8.4 g/dL    Albumin 3.9 3.5 - 5.2 g/dL    Total Bilirubin 0.2 0.1 - 1.0 mg/dL    Alkaline Phosphatase 65 40 - 150 U/L    AST 17 10 - 40 U/L    ALT 13 10 - 44 U/L    eGFR >60 >60 mL/min/1.73 m^2    Anion Gap 5 (L) 8 - 16 mmol/L   Troponin I    Collection Time: 02/09/25 10:29 AM   Result Value Ref Range    Troponin I <0.006 0.000 - 0.026 ng/mL   Pregnancy, urine rapid    Collection Time: 02/09/25 11:37 AM   Result Value Ref Range    Preg Test, Ur Negative    Urinalysis, Reflex to Urine Culture Urine, Clean Catch    Collection Time: 02/09/25 11:37 AM    Specimen: Urine, Clean Catch   Result Value Ref Range    Specimen UA Urine, Clean Catch     Color, UA Colorless (A) Yellow, Straw, Nika    Appearance, UA Clear Clear    pH, UA 8.0 5.0 - 8.0    Specific Gravity, UA 1.015 1.005 - 1.030    Protein, UA Negative Negative    Glucose, UA Negative Negative    Ketones, UA Negative Negative    Bilirubin (UA) Negative Negative    Occult Blood UA Negative Negative    Nitrite, UA Negative Negative    Urobilinogen, UA Negative <2.0 EU/dL    Leukocytes, UA 2+ (A) Negative   Urinalysis Microscopic    Collection Time: 02/09/25 11:37 AM   Result Value Ref Range    RBC, UA 1 0 - 4 /hpf    WBC, UA 2 0 - 5 /hpf    Bacteria Rare None-Occ /hpf    Squam Epithel, UA 2 /hpf    Microscopic Comment SEE COMMENT        Imaging Results:  Imaging Results              CT Head Without Contrast (Final result)  Result time 02/09/25 12:56:01      Final result by Jad Sanchez MD (02/09/25 12:56:01)                   Impression:     No acute intracranial abnormalities are identified.    All CT scans at this location  are performed using dose modulation techniques as appropriate to a performed exam including the following: Automated exposure control; adjustment of the mA and/or kV according to patient size (this includes techniques or standardized protocols for targeted exams where dose is matched to indication / reason for exam; i.e. extremities or head); use of iterative reconstruction technique.    Finalized on: 2/9/2025 12:56 PM By:  Jad Sanchez  [facility]# [id number]      2025-02-09 12:58:[id number]     [facility]               Narrative:    EXAM: EXAM:  CT HEAD WITHOUT CONTRAST    CLINICAL HISTORY: Head trauma    COMPARISON: 10/17/2023    TECHNIQUE: Standard thin-section axial images, with reformatted sagittal and coronal images.    FINDINGS: There is no evidence of acute hemorrhage, focal infarction or midline shift. The ventricles and extra-axial fluid spaces appear normal. Orbital contents appear unremarkable. Visualized paranasal sinuses and mastoid air spaces are clear. No skull fractures are identified and no significant soft tissue reaction is visible in the scalp.                                         The EKG was ordered, reviewed, and independently interpreted by the ED provider.  Interpretation time: 11:11  Rate: 81 BPM  Rhythm: normal sinus rhythm  Interpretation: Cannot rule out anterior infarct, age undetermined. No STEMI.             The Emergency Provider reviewed the vital signs and test results, which are outlined above.     ED Discussion     1:06 PM: Reassessed pt at this time. Discussed with patient and/or family/caretaker all pertinent ED information and results. Discussed pt dx and plan of tx. Gave patient all f/u and return to the ED instructions. All questions and concerns were addressed at this time. Patient and/or family/caretaker expresses understanding of information and instructions, and is comfortable with plan to discharge. Pt is stable for discharge.     I discussed with patient and/or  family/caretaker that evaluation in the ED does not suggest any emergent or life threatening medical conditions requiring immediate intervention beyond what was provided in the ED, and I believe patient is safe for discharge.  Regardless, an unremarkable evaluation in the ED does not preclude the development or presence of a serious of life threatening condition. As such, I instructed that the patient is to return immediately for any worsening or change in current symptoms.         Medical Decision Making  Amount and/or Complexity of Data Reviewed  Labs: ordered. Decision-making details documented in ED Course.  Radiology: ordered. Decision-making details documented in ED Course.  ECG/medicine tests: ordered and independent interpretation performed. Decision-making details documented in ED Course.    Risk  OTC drugs.                ED Medication(s):  Medications   acetaminophen tablet 1,000 mg (1,000 mg Oral Given 2/9/25 1139)       New Prescriptions    No medications on file        Follow-up Information       Kimberly Yañez MD.    Specialty: Family Medicine  Why: As needed  Contact information:  [address] [place] [address] DR Nancy RUIZ [address] [phone]                                 Scribe Attestation:   Scribe #1: I performed the above scribed service and the documentation accurately describes the services I performed. I attest to the accuracy of the note.     Attending:   Physician Attestation Statement for Scribe #1: I, Gabriel Rizvi Jr., MD, personally performed the services described in this documentation, as scribed by Kina Lawson and Edwige Luis, in my presence, and it is both accurate and complete.           Clinical Impression       ICD-10-CM ICD-9-CM   1. Syncope, unspecified syncope type  R55 780.2   2. Syncope  R55 780.2   3. Near syncope  R55 780.2   4. Acute nonintractable headache, unspecified headache type  R51.9 784.0   5. Pituitary tumor  D49.7 239.7   6. Anxiety  F41.9 300.00        Disposition:   Disposition: Discharged  Condition: Stable         Gabriel Rizvi Jr., MD  02/09/25 2705

## 2025-02-09 NOTE — DISCHARGE INSTRUCTIONS
Call/ follow up with Dr Yañez  this week for recheck and to review serum cortisol level    Tylenol as directed for headache your blood work here does not suggest that you have adrenal insufficiency at this time

## 2025-02-10 ENCOUNTER — PATIENT MESSAGE (OUTPATIENT)
Dept: PSYCHIATRY | Facility: CLINIC | Age: 30
End: 2025-02-10
Payer: COMMERCIAL

## 2025-02-10 LAB
IGF-I SERPL-MCNC: 202 NG/ML (ref 66–303)
IGF-I Z-SCORE SERPL: 0.67 SD
OHS QRS DURATION: 72 MS
OHS QTC CALCULATION: 441 MS

## 2025-02-14 LAB — A-PGH SER-MCNC: 0.5 NG/ML

## 2025-02-14 RX ORDER — DIAZEPAM 10 MG/1
TABLET ORAL
Qty: 1 TABLET | Refills: 0 | Status: SHIPPED | OUTPATIENT
Start: 2025-02-14 | End: 2025-02-25

## 2025-02-14 NOTE — TELEPHONE ENCOUNTER
Please assist with scheduling ER follow up with care team next week, we can also discuss lab results at that time. Thank you!

## 2025-02-17 ENCOUNTER — HOSPITAL ENCOUNTER (OUTPATIENT)
Dept: RADIOLOGY | Facility: HOSPITAL | Age: 30
Discharge: HOME | End: 2025-02-17
Attending: FAMILY MEDICINE
Payer: COMMERCIAL

## 2025-02-17 DIAGNOSIS — Z87.898 HISTORY OF PITUITARY TUMOR: ICD-10-CM

## 2025-02-17 PROCEDURE — 70553 MRI BRAIN STEM W/O & W/DYE: CPT | Mod: TC

## 2025-02-17 PROCEDURE — 25500020 PHARM REV CODE 255: Performed by: FAMILY MEDICINE

## 2025-02-17 PROCEDURE — A9585 GADOBUTROL INJECTION: HCPCS | Performed by: FAMILY MEDICINE

## 2025-02-17 RX ORDER — GADOBUTROL 604.72 MG/ML
10 INJECTION INTRAVENOUS
Status: COMPLETED | OUTPATIENT
Start: 2025-02-17 | End: 2025-02-17

## 2025-02-17 RX ADMIN — GADOBUTROL 7 ML: 604.72 INJECTION INTRAVENOUS at 07:02

## 2025-02-18 ENCOUNTER — PATIENT MESSAGE (OUTPATIENT)
Dept: PREADMISSION TESTING | Facility: HOSPITAL | Age: 30
End: 2025-02-18
Payer: COMMERCIAL

## 2025-02-18 ENCOUNTER — RESULTS FOLLOW-UP (OUTPATIENT)
Dept: INTERNAL MEDICINE | Facility: CLINIC | Age: 30
End: 2025-02-18

## 2025-02-20 ENCOUNTER — LAB VISIT (OUTPATIENT)
Dept: LAB | Facility: HOSPITAL | Age: 30
End: 2025-02-20
Attending: OBSTETRICS & GYNECOLOGY
Payer: COMMERCIAL

## 2025-02-20 DIAGNOSIS — Z30.2 ENCOUNTER FOR STERILIZATION: ICD-10-CM

## 2025-02-20 LAB
ANION GAP SERPL CALC-SCNC: 9 MMOL/L (ref 8–16)
BASOPHILS # BLD AUTO: 0.13 K/UL (ref 0–0.2)
BASOPHILS NFR BLD: 1.9 % (ref 0–1.9)
BUN SERPL-MCNC: 12 MG/DL (ref 6–20)
CALCIUM SERPL-MCNC: 9.7 MG/DL (ref 8.7–10.5)
CHLORIDE SERPL-SCNC: 110 MMOL/L (ref 95–110)
CO2 SERPL-SCNC: 21 MMOL/L (ref 23–29)
CREAT SERPL-MCNC: 1 MG/DL (ref 0.5–1.4)
DIFFERENTIAL METHOD BLD: ABNORMAL
EOSINOPHIL # BLD AUTO: 0.1 K/UL (ref 0–0.5)
EOSINOPHIL NFR BLD: 0.9 % (ref 0–8)
ERYTHROCYTE [DISTWIDTH] IN BLOOD BY AUTOMATED COUNT: 16.5 % (ref 11.5–14.5)
EST. GFR  (NO RACE VARIABLE): >60 ML/MIN/1.73 M^2
GLUCOSE SERPL-MCNC: 80 MG/DL (ref 70–110)
HCG INTACT+B SERPL-ACNC: <2.4 MIU/ML
HCT VFR BLD AUTO: 33.6 % (ref 37–48.5)
HGB BLD-MCNC: 10.2 G/DL (ref 12–16)
IMM GRANULOCYTES # BLD AUTO: 0.02 K/UL (ref 0–0.04)
IMM GRANULOCYTES NFR BLD AUTO: 0.3 % (ref 0–0.5)
LYMPHOCYTES # BLD AUTO: 1.9 K/UL (ref 1–4.8)
LYMPHOCYTES NFR BLD: 26.5 % (ref 18–48)
MCH RBC QN AUTO: 25.7 PG (ref 27–31)
MCHC RBC AUTO-ENTMCNC: 30.4 G/DL (ref 32–36)
MCV RBC AUTO: 85 FL (ref 82–98)
MONOCYTES # BLD AUTO: 0.5 K/UL (ref 0.3–1)
MONOCYTES NFR BLD: 7 % (ref 4–15)
NEUTROPHILS # BLD AUTO: 4.4 K/UL (ref 1.8–7.7)
NEUTROPHILS NFR BLD: 63.4 % (ref 38–73)
NRBC BLD-RTO: 0 /100 WBC
PLATELET # BLD AUTO: 408 K/UL (ref 150–450)
PMV BLD AUTO: 10.6 FL (ref 9.2–12.9)
POTASSIUM SERPL-SCNC: 3.9 MMOL/L (ref 3.5–5.1)
RBC # BLD AUTO: 3.97 M/UL (ref 4–5.4)
SODIUM SERPL-SCNC: 140 MMOL/L (ref 136–145)
WBC # BLD AUTO: 6.99 K/UL (ref 3.9–12.7)

## 2025-02-20 PROCEDURE — 80048 BASIC METABOLIC PNL TOTAL CA: CPT | Performed by: OBSTETRICS & GYNECOLOGY

## 2025-02-20 PROCEDURE — 84702 CHORIONIC GONADOTROPIN TEST: CPT | Performed by: OBSTETRICS & GYNECOLOGY

## 2025-02-20 PROCEDURE — 85025 COMPLETE CBC W/AUTO DIFF WBC: CPT | Performed by: OBSTETRICS & GYNECOLOGY

## 2025-02-20 PROCEDURE — 36415 COLL VENOUS BLD VENIPUNCTURE: CPT | Performed by: OBSTETRICS & GYNECOLOGY

## 2025-02-23 PROBLEM — F43.10 PTSD (POST-TRAUMATIC STRESS DISORDER): Status: ACTIVE | Noted: 2025-02-23

## 2025-02-23 PROBLEM — F41.1 GAD (GENERALIZED ANXIETY DISORDER): Status: ACTIVE | Noted: 2025-02-23

## 2025-02-23 NOTE — PROGRESS NOTES
Subjective:       Patient ID: Zoila Sheldon is a 29 y.o. female.    Chief Complaint: Establish Care      History of Present Illness    Patient presents to clinic today to establish care.  - Patient was diagnosed with a pituitary tumor in 2020 at [facility] in [place], [place]. She has had headaches for years, which have remained stable with no new or changing symptoms. The tumor is located on her left optic nerve, causing vision issues primarily on the left side. She reports significant blurriness in that eye and states that her prescription continues to increase in strength, although an ophthalmologist found no apparent issues. With corrective lenses, she can see adequately but needs to blink multiple times to focus.  - A CT about 1.5 years ago appeared normal, but an MRI was recommended at that time, which she did not undergo. She denies any surgery to remove the tumor. Her last follow-up was with neurology and neurosurgery in 2020, but she moved and has not had further specialist care since then.  - Patient also has iron deficiency anemia, which she believes was treated in high school. She reports having heavy menstrual cycles that vary in intensity. She is not currently taking any iron supplements.  - She has skin irritation when scratching, resulting in small blood blisters. She does not currently use any lotion for this condition.  - For her pituitary tumor-related MRIs, the patient has previously had panic attacks and required Xanax for the procedure, though she reports disliking it.  - Patient denies any new or worsening headaches, pacemaker, defibrillator, other implantable devices, history of aneurysm, brain clips, middle ear prosthesis, metal implants, or other foreign objects such as bullets or shrapnel.  Patient is otherwise without concerns today.    ROS:  General: -fever, -chills, -fatigue, -weight gain, -weight loss  Eyes: -eye pain, -vision change, +BLURRY VISION  ENMT:  "-hearing loss, -ear pain, -nasal congestion, -rhinorrhea, -dental problem, -trouble swallowing  Cardiovascular: -chest pain, -palpitations, -lower extremity edema  Respiratory: -cough, -trouble breathing  Gastrointestinal: -abdominal pain, -abdominal swelling, -nausea, -vomiting, -diarrhea, -constipation, -blood in stool  Genitourinary: -difficulty urinating, -genital problem  Musculoskeletal: -joint pain, -muscle aches  Integumentary: -rash, +SKIN LESION  Lymphatics: -swollen glands, -easy bruising  Neurologic: +HEADACHE, -dizziness, -numbness, -weakness  Psychiatric: +ANXIETY, -depression, -sleep difficulty     Objective:     Vitals:    02/06/25 0837   BP: 132/70   Pulse: 104   SpO2: 98%   Weight: 75.9 kg (167 lb 5.3 oz)   Height: 5' 5" (1.651 m)            Physical Exam  Vitals reviewed.   Constitutional:       General: She is not in acute distress.     Appearance: She is well-developed.   HENT:      Head: Normocephalic and atraumatic.   Eyes:      General: Lids are normal. No scleral icterus.     Extraocular Movements: Extraocular movements intact.      Conjunctiva/sclera: Conjunctivae normal.      Pupils: Pupils are equal, round, and reactive to light.   Cardiovascular:      Rate and Rhythm: Normal rate and regular rhythm.      Heart sounds: No murmur heard.     No friction rub. No gallop.   Pulmonary:      Effort: Pulmonary effort is normal.      Breath sounds: Normal breath sounds. No decreased breath sounds, wheezing, rhonchi or rales.   Neurological:      Mental Status: She is alert and oriented to person, place, and time.      Cranial Nerves: No cranial nerve deficit.      Sensory: Sensation is intact.      Motor: Motor function is intact.      Coordination: Coordination is intact.      Gait: Gait normal.   Psychiatric:         Mood and Affect: Mood and affect normal.           Lab Results   Component Value Date    CHOL 137 01/30/2025    TRIG 67 01/30/2025    HDL 36 (L) 01/30/2025    LDLCALC 87.6 " 01/30/2025       Lab Results   Component Value Date    TSH 0.673 01/30/2025       Lab Results   Component Value Date    HGBA1C 4.9 01/30/2025       Assessment:       1. History of pituitary tumor    2. Iron deficiency anemia, unspecified iron deficiency anemia type    3. Bipolar affective disorder, currently depressed, moderate    4. PTSD (post-traumatic stress disorder)    5. ALEXIA (generalized anxiety disorder)        Plan:   1. History of pituitary tumor  -     Cancel: MRI Brain With Contrast; Future; Expected date: 02/06/2025  -     FOLLICLE STIMULATING HORMONE; Future; Expected date: 02/06/2025  -     LUTEINIZING HORMONE; Future; Expected date: 02/06/2025  -     Alpha-Subunit Pituitary Tumor Marker; Future; Expected date: 02/06/2025  -     PROLACTIN; Future; Expected date: 02/06/2025  -     Insulin-like growth factor; Future; Expected date: 02/06/2025  -     ACTH; Future; Expected date: 02/06/2025  -     diazePAM (VALIUM) 10 MG Tab; 1 tablet po 30 minutes prior to MRI study  Dispense: 1 tablet; Refill: 0    2. Iron deficiency anemia, unspecified iron deficiency anemia type  Overview:  Does not take iron consistently    Orders:  -     ferrous gluconate (FERGON) 324 MG tablet; Take 1 tablet (324 mg total) by mouth daily with breakfast.  -     Iron and TIBC; Future; Expected date: 05/06/2025  -     Ferritin; Future; Expected date: 05/06/2025  -     CBC Auto Differential; Future; Expected date: 05/06/2025    3. Bipolar affective disorder, currently depressed, moderate  Overview:  Followed by Psychiatry, Dr. Estes, continue current treatment plan       4. PTSD (post-traumatic stress disorder)  Overview:  Followed by Psychiatry, Dr. Estes, continue current treatment plan       5. ALEXIA (generalized anxiety disorder)  Overview:  Followed by PsychiatryDr. Estes, continue current treatment plan         Assessment & Plan    PITUITARY TUMOR:   Ordered an MRI of the brain to evaluate the current status of the  tumor.   Ordered endocrine labs including insulin-like growth factor and prolactin levels.   Consider e-consult with endocrinologist if lab results are abnormal.   Prescribed Valium for the patient to take before the MRI procedure to manage anxiety.   Prescribed Valium: Take 1 tablet 30 minutes before MRI; must have someone drive.   Noted the patient's history of pituitary tumor, diagnosed in 2020.    CHRONIC HEADACHES:   Evaluated chronic headaches, likely related to left-sided pituitary tumor.   Noted that the patient reports ongoing headaches for years, primarily on the left side.   Confirmed that the headaches are stable with no new or changing symptoms.   Previously referred to neurology by Miriam. Advised to keep scheduled appointment.    IRON DEFICIENCY ANEMIA:   Noted iron deficiency anemia, slightly worse than baseline.   Inquired about heavy menstrual cycles as a potential cause of iron deficiency anemia.   Prescribed Ferrous Gluconate (OTC iron supplement): Take 1 tablet daily with breakfast.   Ordered iron studies to be repeated in 3 months.    VISION CONCERNS:   Assessed vision concerns; the patient reports blurriness, especially in the left eye, which has been stable for a while.   Noted that the ophthalmologist reported no apparent issues despite increasing prescription strength.   Noted that the patient reports needing glasses and increasing prescription strength.   Continue follow up with eye doctor as directed.    DERMATOGRAPHISM:   Noted possible dermatographism on skin exam.   Observed evidence of scratching, resulting in small blood blisters.   Explained importance of moisturizing skin and avoiding hot showers to prevent itching and skin irritation.   Instructed the patient to apply unscented lotion 2-3 times daily.   Advised the patient to avoid scratching skin.   Recommend limiting showers and using lukewarm water instead of hot water.    Patient expressed understanding and agreement with  plan.    Visit today included increased complexity associated with the care of the episodic problem pituitary tumor, which was addressed while instituting co-management of the longitudinal care of the patient due to the serious and/or complex managed problem(s) .    I have evaluated and discussed management associated with medical care services that serve as the continuing focal point for all needed health care services and/or with medical care services that are part of ongoing care related to my patient's single, serious condition or a complex condition(s).    I am providing ongoing care and I am the primary care provider for this patient, and they are being managed, monitored, and/or observed for their chronic conditions over time.     I have addressed their ongoing health maintenance requirements and needs for all health care services and reviewed co-management plans provided by specialty providers when available.    Health Maintenance Due   Topic Date Due    TETANUS VACCINE  10/18/2017    Pap Smear  Never done    Influenza Vaccine (1) 09/01/2024    COVID-19 Vaccine (1 - 2024-25 season) Never done     Health Maintenance reviewed/updated.    Follow up in about 1 year (around 2/6/2026), or if symptoms worsen or fail to improve, for annual with Sammie NICHOLE, labs today, schedule labs, **LOOK AT DATES ON LAB ORDERS**.    This note was generated with the assistance of ambient listening technology. Verbal consent was obtained by the patient and accompanying visitor(s) for the recording of patient appointment to facilitate this note. I attest to having reviewed and edited the generated note for accuracy, though some syntax or spelling errors may persist. Please contact the author of this note for any clarification.

## 2025-02-25 ENCOUNTER — ANESTHESIA EVENT (OUTPATIENT)
Dept: SURGERY | Facility: HOSPITAL | Age: 30
End: 2025-02-25
Payer: COMMERCIAL

## 2025-02-25 ENCOUNTER — OFFICE VISIT (OUTPATIENT)
Dept: PSYCHIATRY | Facility: CLINIC | Age: 30
End: 2025-02-25
Payer: COMMERCIAL

## 2025-02-25 DIAGNOSIS — F31.32 BIPOLAR AFFECTIVE DISORDER, CURRENTLY DEPRESSED, MODERATE: Primary | ICD-10-CM

## 2025-02-25 DIAGNOSIS — F60.3 BORDERLINE PERSONALITY DISORDER: ICD-10-CM

## 2025-02-25 DIAGNOSIS — F41.1 GENERALIZED ANXIETY DISORDER WITH PANIC ATTACKS: ICD-10-CM

## 2025-02-25 DIAGNOSIS — F43.10 POST TRAUMATIC STRESS DISORDER (PTSD): ICD-10-CM

## 2025-02-25 DIAGNOSIS — F41.0 GENERALIZED ANXIETY DISORDER WITH PANIC ATTACKS: ICD-10-CM

## 2025-02-25 RX ORDER — LAMOTRIGINE 100 MG/1
100 TABLET ORAL DAILY
Qty: 30 TABLET | Refills: 2 | Status: CANCELLED | OUTPATIENT
Start: 2025-02-25
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
## 2025-02-25 NOTE — ANESTHESIA PREPROCEDURE EVALUATION
2025  Zoila Sheldon is a 29 y.o., female.    Past Medical History:   Diagnosis Date    Anemia, unspecified     Anxiety disorder, unspecified     Bipolar disorder, unspecified     Borderline personality disorder     Depression     Pituitary tumor      Past Surgical History:   Procedure Laterality Date    INDUCED       Surgical    [place] teeth surgery         Pre-op Assessment    I have reviewed the Patient Summary Reports.    I have reviewed the NPO Status.   I have reviewed the Medications.     Review of Systems  Anesthesia Hx:               Denies Personal Hx of Anesthesia complications.                    Hematology/Oncology:       -- Anemia:                                  Cardiovascular:  Cardiovascular Normal                                              Pulmonary:  Pulmonary Normal                       Renal/:  Renal/ Normal                 Hepatic/GI:  Hepatic/GI Normal                    Endocrine:  Endocrine Normal            Psych:  Psychiatric History anxiety depression                Physical Exam  General: Well nourished    Airway:  Mallampati: II   Mouth Opening: Normal  TM Distance: Normal  Tongue: Normal  Neck ROM: Normal ROM    Dental:  Intact        Anesthesia Plan  Type of Anesthesia, risks & benefits discussed:    Anesthesia Type: Gen ETT  Intra-op Monitoring Plan: Standard ASA Monitors  Post Op Pain Control Plan: multimodal analgesia and IV/PO Opioids PRN  Induction:  IV  Informed Consent: Informed consent signed with the Patient and all parties understand the risks and agree with anesthesia plan.  All questions answered. Patient consented to blood products? No  ASA Score: 2  Day of Surgery Review of History & Physical: H&P Update referred to the surgeon/provider.    Ready For Surgery From Anesthesia Perspective.     .

## 2025-02-26 ENCOUNTER — PATIENT MESSAGE (OUTPATIENT)
Dept: RESPIRATORY THERAPY | Facility: HOSPITAL | Age: 30
End: 2025-02-26
Payer: COMMERCIAL

## 2025-02-26 ENCOUNTER — TELEPHONE (OUTPATIENT)
Dept: OBSTETRICS AND GYNECOLOGY | Facility: CLINIC | Age: 30
End: 2025-02-26
Payer: COMMERCIAL

## 2025-02-26 NOTE — SUBJECTIVE & OBJECTIVE
OB History    Para Term  AB Living   7 4 0 4 3 4   SAB IAB Ectopic Multiple Live Births   0 2 0 0 4      # Outcome Date GA Lbr Arden/2nd Weight Sex Type Anes PTL Lv   7   33w0d   M  EPI  MARYLIN      Complications: Hypertension   6 Molar  25w0d             Complications: Preeclampsia   5 IAB            4   36w0d   M Vag-Spont None  MARYLIN      Complications: Encounter for blood transfusion   3   35w0d   M Vag-Spont EPI  MARYLIN   2 IAB            1   35w0d   F Vag-Spont None  MARYLIN     Past Medical History:   Diagnosis Date    Anemia, unspecified     Anxiety disorder, unspecified     Bipolar disorder, unspecified     Borderline personality disorder     Depression     Pituitary tumor      Past Surgical History:   Procedure Laterality Date    INDUCED       Surgical    [place] teeth surgery         No medications prior to admission.       Review of patient's allergies indicates:   Allergen Reactions    Lactase Diarrhea    Milk containing products (dairy) Diarrhea and Nausea And Vomiting        Family History       Problem Relation (Age of Onset)    ADD / ADHD Mother, Sister    Anxiety Mother    Bipolar disorder Father    Colon cancer Paternal Grandfather    Depression Mother    Prostate cancer Maternal Grandfather          Tobacco Use    Smoking status: Never    Smokeless tobacco: Never   Substance and Sexual Activity    Alcohol use: Not Currently    Drug use: Yes     Types: Marijuana     Comment: smokes: 1gm/wk    Sexual activity: Not Currently     Partners: Male     Review of Systems   Constitutional:  Negative for appetite change, chills, fatigue, fever and unexpected weight change.   HENT: Negative.     Eyes:  Negative for visual disturbance.   Respiratory:  Negative for shortness of breath and wheezing.    Cardiovascular:  Negative for chest pain, palpitations and leg swelling.   Gastrointestinal:  Negative for abdominal pain, bloating, blood in stool, constipation, diarrhea,  nausea and vomiting.   Endocrine: Negative for hair loss, hot flashes and hypothyroidism.   Genitourinary:  Negative for dysmenorrhea, dyspareunia, dysuria, flank pain, frequency, genital sores, hematuria, menorrhagia, menstrual problem, pelvic pain, urgency, vaginal bleeding, vaginal discharge, urinary incontinence and vaginal odor.   Musculoskeletal:  Negative for back pain, joint swelling and myalgias.   Integumentary:  Negative for rash, hair changes, breast mass, nipple discharge and breast skin changes.   Neurological:  Negative for syncope and headaches.   Hematological:  Negative for adenopathy. Does not bruise/bleed easily.   Psychiatric/Behavioral:  Negative for depression and sleep disturbance. The patient is not nervous/anxious.    Breast: Negative for mass, mastodynia, nipple discharge and skin changes     Objective:     Vital Signs (Most Recent):    Vital Signs (24h Range):        Weight: 78.9 kg (174 lb)  Body mass index is 28.96 kg/m².    Patient's last menstrual period was 02/04/2025 (approximate).     Physical Exam:   Constitutional: She is oriented to person, place, and time. Vital signs are normal. She appears well-developed and well-nourished. No distress.    HENT:   Head: Normocephalic and atraumatic.   Right Ear: External ear normal.   Left Ear: External ear normal.   Nose: Nose normal.    Eyes: Pupils are equal, round, and reactive to light. Conjunctivae are normal.    Neck: Trachea normal. No JVD present. No thyroid mass and no thyromegaly present.    Cardiovascular:  Normal rate and regular rhythm.             Pulmonary/Chest: Effort normal and breath sounds normal. No respiratory distress.        Abdominal: Soft. Bowel sounds are normal. She exhibits no distension and no mass. There is no abdominal tenderness. No hernia. Hernia confirmed negative in the ventral area, confirmed negative in the right inguinal area and confirmed negative in the left inguinal area.     Genitourinary:     Vagina, uterus and rectum normal.   Rectum:      No external hemorrhoid or abnormal anal tone.     Labial bartholins normal.There is no rash, tenderness or lesion on the right labia. There is no rash, tenderness or lesion on the left labia. Cervix is normal. Right adnexum displays no mass, no tenderness and no fullness. Left adnexum displays no mass, no tenderness and no fullness. No vaginal discharge, tenderness, bleeding, rectocele, cystocele or prolapse of vaginal walls in the vagina.    No foreign body in the vagina.   Cervix exhibits no motion tenderness, no discharge and no friability. Uterus is not deviated, not enlarged and not tender.           Musculoskeletal: Normal range of motion.       Neurological: She is alert and oriented to person, place, and time. She has normal reflexes.    Skin: Skin is warm and dry. She is not diaphoretic.    Psychiatric: She has a normal mood and affect. Her speech is normal and behavior is normal. Thought content normal.        Laboratory:  I have personallly reviewed all pertinent lab results from the last 24 hours.    Diagnostic Results:  Labs: Reviewed

## 2025-02-26 NOTE — H&P
[facility]  Obstetrics & Gynecology  History & Physical    Patient Name: Zoila Sheldon  MRN: [id number]  Admission Date: (Not on file)  Primary Care Provider: No primary care provider on file.    Subjective:     Chief Complaint/Reason for Admission:     History of Present Illness:  Desires surgical sterilization   Discussed surgery options after review of the medical record   Recommend bilateral salpingectomy         OB History    Para Term  AB Living   7 4 0 4 3 4   SAB IAB Ectopic Multiple Live Births   0 2 0 0 4      # Outcome Date GA Lbr Arden/2nd Weight Sex Type Anes PTL Lv   7   33w0d   M  EPI  MARYLIN      Complications: Hypertension   6 Molar  25w0d             Complications: Preeclampsia   5 IAB            4   36w0d   M Vag-Spont None  MARYLIN      Complications: Encounter for blood transfusion   3   35w0d   M Vag-Spont EPI  MARYLIN   2 IAB            1   35w0d   F Vag-Spont None  MARYLIN     Past Medical History:   Diagnosis Date    Anemia, unspecified     Anxiety disorder, unspecified     Bipolar disorder, unspecified     Borderline personality disorder     Depression     Pituitary tumor      Past Surgical History:   Procedure Laterality Date    INDUCED       Surgical    [place] teeth surgery         No medications prior to admission.       Review of patient's allergies indicates:   Allergen Reactions    Lactase Diarrhea    Milk containing products (dairy) Diarrhea and Nausea And Vomiting        Family History       Problem Relation (Age of Onset)    ADD / ADHD Mother, Sister    Anxiety Mother    Bipolar disorder Father    Colon cancer Paternal Grandfather    Depression Mother    Prostate cancer Maternal Grandfather          Tobacco Use    Smoking status: Never    Smokeless tobacco: Never   Substance and Sexual Activity    Alcohol use: Not Currently    Drug use: Yes     Types: Marijuana     Comment: smokes: 1gm/wk    Sexual activity: Not  Currently     Partners: Male     Review of Systems   Constitutional:  Negative for appetite change, chills, fatigue, fever and unexpected weight change.   HENT: Negative.     Eyes:  Negative for visual disturbance.   Respiratory:  Negative for shortness of breath and wheezing.    Cardiovascular:  Negative for chest pain, palpitations and leg swelling.   Gastrointestinal:  Negative for abdominal pain, bloating, blood in stool, constipation, diarrhea, nausea and vomiting.   Endocrine: Negative for hair loss, hot flashes and hypothyroidism.   Genitourinary:  Negative for dysmenorrhea, dyspareunia, dysuria, flank pain, frequency, genital sores, hematuria, menorrhagia, menstrual problem, pelvic pain, urgency, vaginal bleeding, vaginal discharge, urinary incontinence and vaginal odor.   Musculoskeletal:  Negative for back pain, joint swelling and myalgias.   Integumentary:  Negative for rash, hair changes, breast mass, nipple discharge and breast skin changes.   Neurological:  Negative for syncope and headaches.   Hematological:  Negative for adenopathy. Does not bruise/bleed easily.   Psychiatric/Behavioral:  Negative for depression and sleep disturbance. The patient is not nervous/anxious.    Breast: Negative for mass, mastodynia, nipple discharge and skin changes     Objective:     Vital Signs (Most Recent):    Vital Signs (24h Range):        Weight: 78.9 kg (174 lb)  Body mass index is 28.96 kg/m².    Patient's last menstrual period was 02/04/2025 (approximate).     Physical Exam:   Constitutional: She is oriented to person, place, and time. Vital signs are normal. She appears well-developed and well-nourished. No distress.    HENT:   Head: Normocephalic and atraumatic.   Right Ear: External ear normal.   Left Ear: External ear normal.   Nose: Nose normal.    Eyes: Pupils are equal, round, and reactive to light. Conjunctivae are normal.    Neck: Trachea normal. No JVD present. No thyroid mass and no thyromegaly  present.    Cardiovascular:  Normal rate and regular rhythm.             Pulmonary/Chest: Effort normal and breath sounds normal. No respiratory distress.        Abdominal: Soft. Bowel sounds are normal. She exhibits no distension and no mass. There is no abdominal tenderness. No hernia. Hernia confirmed negative in the ventral area, confirmed negative in the right inguinal area and confirmed negative in the left inguinal area.     Genitourinary:    Vagina, uterus and rectum normal.   Rectum:      No external hemorrhoid or abnormal anal tone.     Labial bartholins normal.There is no rash, tenderness or lesion on the right labia. There is no rash, tenderness or lesion on the left labia. Cervix is normal. Right adnexum displays no mass, no tenderness and no fullness. Left adnexum displays no mass, no tenderness and no fullness. No vaginal discharge, tenderness, bleeding, rectocele, cystocele or prolapse of vaginal walls in the vagina.    No foreign body in the vagina.   Cervix exhibits no motion tenderness, no discharge and no friability. Uterus is not deviated, not enlarged and not tender.           Musculoskeletal: Normal range of motion.       Neurological: She is alert and oriented to person, place, and time. She has normal reflexes.    Skin: Skin is warm and dry. She is not diaphoretic.    Psychiatric: She has a normal mood and affect. Her speech is normal and behavior is normal. Thought content normal.        Laboratory:  I have personallly reviewed all pertinent lab results from the last 24 hours.    Diagnostic Results:  Labs: Reviewed    Assessment/Plan:     Renal/  * Encounter for consultation for female sterilization  Laparoscopy with bilateral salpingectomy         DONNY Will MD  Obstetrics & Gynecology  [facility]

## 2025-02-26 NOTE — TELEPHONE ENCOUNTER
----- Message from Dania sent at 2/26/2025  1:26 PM CST -----  Contact: Zoila  Patient is calling to speak with someone regarding procedure. Patient reports scheduled for surgery tomorrow and request to confirm arrival time. Please give patient a call back at [phone] to assist. If no answer please leave a detailed voice message. Thank you,GH

## 2025-02-26 NOTE — HPI
Desires surgical sterilization   Discussed surgery options after review of the medical record   Recommend bilateral salpingectomy

## 2025-02-26 NOTE — TELEPHONE ENCOUNTER
*first called pre-admit team for pt arrival time for surgery*     Returned pt call confirmed pt identifiers informed pt surgery is scheduled for 11:30 BUT she has to arrive an hour and a half earlier than that time so at 10 am pt voiced understanding of this

## 2025-02-27 ENCOUNTER — ANESTHESIA (OUTPATIENT)
Dept: SURGERY | Facility: HOSPITAL | Age: 30
End: 2025-02-27
Payer: COMMERCIAL

## 2025-02-27 ENCOUNTER — HOSPITAL ENCOUNTER (OUTPATIENT)
Facility: HOSPITAL | Age: 30
Discharge: HOME | End: 2025-02-27
Attending: OBSTETRICS & GYNECOLOGY | Admitting: OBSTETRICS & GYNECOLOGY
Payer: COMMERCIAL

## 2025-02-27 VITALS
TEMPERATURE: 98 F | OXYGEN SATURATION: 100 % | SYSTOLIC BLOOD PRESSURE: 124 MMHG | WEIGHT: 165.13 LBS | HEART RATE: 87 BPM | BODY MASS INDEX: 27.51 KG/M2 | DIASTOLIC BLOOD PRESSURE: 70 MMHG | RESPIRATION RATE: 18 BRPM | HEIGHT: 65 IN

## 2025-02-27 DIAGNOSIS — Z30.09 ENCOUNTER FOR CONSULTATION FOR FEMALE STERILIZATION: ICD-10-CM

## 2025-02-27 LAB
B-HCG UR QL: NEGATIVE
CTP QC/QA: YES

## 2025-02-27 PROCEDURE — 58661 LAPAROSCOPY REMOVE ADNEXA: CPT | Mod: AS,50,, | Performed by: PHYSICIAN ASSISTANT

## 2025-02-27 PROCEDURE — 88302 TISSUE EXAM BY PATHOLOGIST: CPT | Mod: 59 | Performed by: PATHOLOGY

## 2025-02-27 PROCEDURE — 81025 URINE PREGNANCY TEST: CPT | Performed by: OBSTETRICS & GYNECOLOGY

## 2025-02-27 PROCEDURE — 27201423 OPTIME MED/SURG SUP & DEVICES STERILE SUPPLY: Performed by: OBSTETRICS & GYNECOLOGY

## 2025-02-27 PROCEDURE — 63600175 PHARM REV CODE 636 W HCPCS: Performed by: NURSE ANESTHETIST, CERTIFIED REGISTERED

## 2025-02-27 PROCEDURE — 36000709 HC OR TIME LEV III EA ADD 15 MIN: Performed by: OBSTETRICS & GYNECOLOGY

## 2025-02-27 PROCEDURE — 36000708 HC OR TIME LEV III 1ST 15 MIN: Performed by: OBSTETRICS & GYNECOLOGY

## 2025-02-27 PROCEDURE — 63600175 PHARM REV CODE 636 W HCPCS: Mod: JZ,TB | Performed by: OBSTETRICS & GYNECOLOGY

## 2025-02-27 PROCEDURE — 71000033 HC RECOVERY, INTIAL HOUR: Performed by: OBSTETRICS & GYNECOLOGY

## 2025-02-27 PROCEDURE — 71000015 HC POSTOP RECOV 1ST HR: Performed by: OBSTETRICS & GYNECOLOGY

## 2025-02-27 PROCEDURE — 25000003 PHARM REV CODE 250: Performed by: NURSE ANESTHETIST, CERTIFIED REGISTERED

## 2025-02-27 PROCEDURE — 63600175 PHARM REV CODE 636 W HCPCS: Performed by: ANESTHESIOLOGY

## 2025-02-27 PROCEDURE — 88302 TISSUE EXAM BY PATHOLOGIST: CPT | Mod: 26,,, | Performed by: PATHOLOGY

## 2025-02-27 PROCEDURE — 58661 LAPAROSCOPY REMOVE ADNEXA: CPT | Mod: 50,,, | Performed by: OBSTETRICS & GYNECOLOGY

## 2025-02-27 PROCEDURE — 37000009 HC ANESTHESIA EA ADD 15 MINS: Performed by: OBSTETRICS & GYNECOLOGY

## 2025-02-27 PROCEDURE — 37000008 HC ANESTHESIA 1ST 15 MINUTES: Performed by: OBSTETRICS & GYNECOLOGY

## 2025-02-27 RX ORDER — ONDANSETRON HYDROCHLORIDE 2 MG/ML
4 INJECTION, SOLUTION INTRAVENOUS DAILY PRN
Status: DISCONTINUED | OUTPATIENT
Start: 2025-02-27 | End: 2025-02-27 | Stop reason: HOSPADM

## 2025-02-27 RX ORDER — FENTANYL CITRATE 50 UG/ML
25 INJECTION, SOLUTION INTRAMUSCULAR; INTRAVENOUS EVERY 5 MIN PRN
Status: DISCONTINUED | OUTPATIENT
Start: 2025-02-27 | End: 2025-02-27 | Stop reason: HOSPADM

## 2025-02-27 RX ORDER — FAMOTIDINE 20 MG/1
20 TABLET, FILM COATED ORAL
Status: DISCONTINUED | OUTPATIENT
Start: 2025-02-27 | End: 2025-02-27 | Stop reason: HOSPADM

## 2025-02-27 RX ORDER — PROPOFOL 10 MG/ML
VIAL (ML) INTRAVENOUS
Status: DISCONTINUED | OUTPATIENT
Start: 2025-02-27 | End: 2025-02-27

## 2025-02-27 RX ORDER — GLUCAGON 1 MG
1 KIT INJECTION
Status: DISCONTINUED | OUTPATIENT
Start: 2025-02-27 | End: 2025-02-27 | Stop reason: HOSPADM

## 2025-02-27 RX ORDER — ONDANSETRON HYDROCHLORIDE 2 MG/ML
INJECTION, SOLUTION INTRAVENOUS
Status: DISCONTINUED | OUTPATIENT
Start: 2025-02-27 | End: 2025-02-27

## 2025-02-27 RX ORDER — KETOROLAC TROMETHAMINE 30 MG/ML
15 INJECTION, SOLUTION INTRAMUSCULAR; INTRAVENOUS EVERY 6 HOURS PRN
Status: DISCONTINUED | OUTPATIENT
Start: 2025-02-27 | End: 2025-02-27 | Stop reason: HOSPADM

## 2025-02-27 RX ORDER — ACETAMINOPHEN 500 MG
1000 TABLET ORAL
Status: DISCONTINUED | OUTPATIENT
Start: 2025-02-27 | End: 2025-02-27 | Stop reason: HOSPADM

## 2025-02-27 RX ORDER — DEXAMETHASONE SODIUM PHOSPHATE 4 MG/ML
INJECTION, SOLUTION INTRA-ARTICULAR; INTRALESIONAL; INTRAMUSCULAR; INTRAVENOUS; SOFT TISSUE
Status: DISCONTINUED | OUTPATIENT
Start: 2025-02-27 | End: 2025-02-27

## 2025-02-27 RX ORDER — OXYCODONE AND ACETAMINOPHEN 5; 325 MG/1; MG/1
1 TABLET ORAL
Status: DISCONTINUED | OUTPATIENT
Start: 2025-02-27 | End: 2025-02-27 | Stop reason: HOSPADM

## 2025-02-27 RX ORDER — ONDANSETRON 4 MG/1
8 TABLET, ORALLY DISINTEGRATING ORAL EVERY 8 HOURS PRN
Status: DISCONTINUED | OUTPATIENT
Start: 2025-02-27 | End: 2025-02-27 | Stop reason: HOSPADM

## 2025-02-27 RX ORDER — FENTANYL CITRATE 50 UG/ML
INJECTION, SOLUTION INTRAMUSCULAR; INTRAVENOUS
Status: DISCONTINUED | OUTPATIENT
Start: 2025-02-27 | End: 2025-02-27

## 2025-02-27 RX ORDER — SODIUM CHLORIDE, SODIUM LACTATE, POTASSIUM CHLORIDE, CALCIUM CHLORIDE 600; 310; 30; 20 MG/100ML; MG/100ML; MG/100ML; MG/100ML
INJECTION, SOLUTION INTRAVENOUS CONTINUOUS PRN
Status: DISCONTINUED | OUTPATIENT
Start: 2025-02-27 | End: 2025-02-27

## 2025-02-27 RX ORDER — LIDOCAINE HYDROCHLORIDE 20 MG/ML
INJECTION INTRAVENOUS
Status: DISCONTINUED | OUTPATIENT
Start: 2025-02-27 | End: 2025-02-27

## 2025-02-27 RX ORDER — MIDAZOLAM HYDROCHLORIDE 1 MG/ML
INJECTION INTRAMUSCULAR; INTRAVENOUS
Status: DISCONTINUED | OUTPATIENT
Start: 2025-02-27 | End: 2025-02-27

## 2025-02-27 RX ORDER — ROCURONIUM BROMIDE 10 MG/ML
INJECTION, SOLUTION INTRAVENOUS
Status: DISCONTINUED | OUTPATIENT
Start: 2025-02-27 | End: 2025-02-27

## 2025-02-27 RX ORDER — HYDROCODONE BITARTRATE AND ACETAMINOPHEN 5; 325 MG/1; MG/1
1 TABLET ORAL EVERY 6 HOURS PRN
Qty: 15 TABLET | Refills: 0 | Status: SHIPPED | OUTPATIENT
Start: 2025-02-27 | End: 2025-03-06

## 2025-02-27 RX ADMIN — MIDAZOLAM 2 MG: 1 INJECTION INTRAMUSCULAR; INTRAVENOUS at 11:02

## 2025-02-27 RX ADMIN — KETOROLAC TROMETHAMINE 15 MG: 30 INJECTION, SOLUTION INTRAMUSCULAR; INTRAVENOUS at 12:02

## 2025-02-27 RX ADMIN — FENTANYL CITRATE 50 MCG: 50 INJECTION, SOLUTION INTRAMUSCULAR; INTRAVENOUS at 11:02

## 2025-02-27 RX ADMIN — FENTANYL CITRATE 25 MCG: 50 INJECTION INTRAMUSCULAR; INTRAVENOUS at 12:02

## 2025-02-27 RX ADMIN — ROCURONIUM BROMIDE 30 MG: 10 SOLUTION INTRAVENOUS at 11:02

## 2025-02-27 RX ADMIN — SODIUM CHLORIDE, POTASSIUM CHLORIDE, SODIUM LACTATE AND CALCIUM CHLORIDE: 600; 310; 30; 20 INJECTION, SOLUTION INTRAVENOUS at 11:02

## 2025-02-27 RX ADMIN — LIDOCAINE HYDROCHLORIDE 50 MG: 20 INJECTION INTRAVENOUS at 11:02

## 2025-02-27 RX ADMIN — SUGAMMADEX 200 MG: 100 INJECTION, SOLUTION INTRAVENOUS at 12:02

## 2025-02-27 RX ADMIN — ONDANSETRON 4 MG: 2 INJECTION INTRAMUSCULAR; INTRAVENOUS at 12:02

## 2025-02-27 RX ADMIN — DEXAMETHASONE SODIUM PHOSPHATE 4 MG: 4 INJECTION, SOLUTION INTRA-ARTICULAR; INTRALESIONAL; INTRAMUSCULAR; INTRAVENOUS; SOFT TISSUE at 11:02

## 2025-02-27 RX ADMIN — PROPOFOL 200 MG: 10 INJECTION, EMULSION INTRAVENOUS at 11:02

## 2025-02-27 NOTE — ANESTHESIA PROCEDURE NOTES
Intubation    Date/Time: 2/27/2025 11:38 AM    Performed by: Hermila Simmons CRNA  Authorized by: Timoteo Mcdaniel MD    Intubation:     Induction:  Intravenous    Intubated:  Postinduction    Mask Ventilation:  Easy mask    Attempts:  1    Attempted By:  CRNA    Method of Intubation:  Video laryngoscopy    Blade:  Torrez 3    Laryngeal View Grade: Grade I - full view of cords      Difficult Airway Encountered?: No      Complications:  None    Airway Device:  Oral endotracheal tube    Airway Device Size:  7.0    Style/Cuff Inflation:  Cuffed (inflated to minimal occlusive pressure)    Inflation Amount (mL):  6    Tube secured:  21    Secured at:  The lips    Placement Verified By:  Capnometry    Complicating Factors:  None    Findings Post-Intubation:  BS equal bilateral and atraumatic/condition of teeth unchanged      
no

## 2025-02-27 NOTE — TRANSFER OF CARE
"Anesthesia Transfer of Care Note    Patient: Zoila Sheldon    Procedure(s) Performed: Procedure(s) (LRB):  SALPINGECTOMY, LAPAROSCOPIC (Bilateral)    Patient location: PACU    Anesthesia Type: general    Transport from OR: Transported from OR on room air with adequate spontaneous ventilation    Post pain: adequate analgesia    Post assessment: no apparent anesthetic complications    Post vital signs: stable    Level of consciousness: sedated    Nausea/Vomiting: no nausea/vomiting    Complications: none    Transfer of care protocol was followed      Last vitals: Visit Vitals  /73 (BP Location: Right arm, Patient Position: Sitting)   Pulse 91   Temp 36.1 °C (97 °F) (Temporal)   Resp 14   Ht 5' 5" (1.651 m)   Wt 74.9 kg (165 lb 2 oz)   LMP 02/04/2025 (Approximate)   SpO2 100%   Breastfeeding No   BMI 27.48 kg/m²     "

## 2025-02-27 NOTE — OP NOTE
[facility]  Surgery Department  Operative Note    SUMMARY     Date of Procedure: 2/27/2025     Procedure: Procedure(s) (LRB):  SALPINGECTOMY, LAPAROSCOPIC (Bilateral)     Surgeons and Role:     * DEBBIE Will MD - Primary    Assisting Surgeon:      Jennifer NICHOLE assistance deemed necessary by MD    Pre-Operative Diagnosis: Encounter for sterilization [Z30.2]    Post-Operative Diagnosis: Post-Op Diagnosis Codes:     * Encounter for sterilization [Z30.2]    Anesthesia: General    Operative Findings (including complications, if any):     Description of Technical Procedures:     Findings:        Upper Abdomen:  No organomegaly, masses or significant adhesions      Bowel:  Gillian appearance with no significant adhesions      Pelvis:  Uterus:  normal size and shape                    Right Adnexa  normal                    Left Adnexa   normal                    Cul de sac    free of lesions or adhesions                     Anterior peritoneum with no endometriosis and no adhesions       Ureters noted to be in normal anatomical location right and left side     Operative details        Once under general anesthesia, placed in supine lithotomy position       Vaginal prep with  Betadine        Morgan placed,         Manipulator:  Zumi placed in the uterus with no difficulty        Abdominal prep  Chlorhexiding swab.       Drapes applied in the usual manner         Time out taken with all present in the room         Verrhes needle placed through the umbilicus while elevating  with towel clamps on each side of the umbilicus        Co2 gas insuflation to 15 mm pressure          5 mm blunt trochar  placed through an  incision in the inferior  aspect of umbilicus          Trochar placement under direct visualization as follows:           5 mm LLQ            8 mm RLQ              Using grasper and the Vessel Seal instrument with patient in trendelenburg position, the mesosalpinx of the right  and left fallopian tubes taken down hemostatically from the distal to the proximal end of the tubes                Good hemostasis noted     Each tube removed through the 8 mm trochar   Trochar sites closed with SQ suture and dermabond           Morgan removed, vaginal manipulator removed          Patient awakened and taken to recovery      Significant Surgical Tasks Conducted by the Assistant(s), if Applicable: first assist and trochar site closure     Estimated Blood Loss (EBL): 5 cc           Implants: * No implants in log *    Specimens:   Specimen (24h ago, onward)       Start     Ordered    02/27/25 1204  Specimen to Pathology, Surgery Gynecology and Obstetrics  Once        Comments: Pre-op Diagnosis: Encounter for sterilization [Z30.2]Procedure(s):SALPINGECTOMY, LAPAROSCOPIC Number of specimens: 2Name of specimens: 1. Left fallopian tube2. Right fallopian tube     References:    Click here for ordering Quick Tip   Question Answer Comment   Procedure Type: Gynecology and Obstetrics    Specimen Class: Routine/Screening    Release to patient Immediate        02/27/25 1204                            Condition: Good    Disposition: PACU - hemodynamically stable.    Attestation: Op Note Attestation: I was physically present and scrubbed for the entire procedure.

## 2025-02-27 NOTE — DISCHARGE SUMMARY
[facility]  Discharge Note  Short Stay    Procedure(s) (LRB):  SALPINGECTOMY, LAPAROSCOPIC (Bilateral)      OUTCOME: Patient tolerated treatment/procedure well without complication and is now ready for discharge.    DISPOSITION: Home or Self Care    FINAL DIAGNOSIS:  Encounter for consultation for female sterilization    FOLLOWUP: In clinic    DISCHARGE INSTRUCTIONS:    Discharge Procedure Orders   Diet general     No dressing needed     Call MD for:  temperature >100.4     Call MD for:  persistent nausea and vomiting     Call MD for:  severe uncontrolled pain     Call MD for:  difficulty breathing, headache or visual disturbances     Call MD for:  redness, tenderness, or signs of infection (pain, swelling, redness, odor or green/yellow discharge around incision site)     Call MD for:  hives     Call MD for:  persistent dizziness or light-headedness     Call MD for:  extreme fatigue     Shower on day dressing removed (No bath)        TIME SPENT ON DISCHARGE: 5 minutes

## 2025-02-27 NOTE — ANESTHESIA POSTPROCEDURE EVALUATION
Anesthesia Post Evaluation    Patient: Zoila Sheldon    Procedure(s) Performed: Procedure(s) (LRB):  SALPINGECTOMY, LAPAROSCOPIC (Bilateral)    Final Anesthesia Type: general      Patient location during evaluation: PACU  Patient participation: Yes- Able to Participate  Level of consciousness: awake  Post-procedure vital signs: reviewed and stable  Pain management: adequate  Airway patency: patent    PONV status at discharge: No PONV  Anesthetic complications: no      Cardiovascular status: stable  Respiratory status: unassisted  Hydration status: euvolemic  Follow-up not needed.              Vitals Value Taken Time   /70 02/27/25 13:22   Temp 36.8 °C (98.2 °F) 02/27/25 12:22   Pulse 87 02/27/25 13:22   Resp 18 02/27/25 13:22   SpO2 100 % 02/27/25 13:22         Event Time   Out of Recovery 13:03:00         Pain/Kwame Score: Pain Rating Prior to Med Admin: 5 (2/27/2025 12:58 PM)  Kwame Score: 10 (2/27/2025  1:22 PM)

## 2025-03-01 ENCOUNTER — HOSPITAL ENCOUNTER (EMERGENCY)
Facility: HOSPITAL | Age: 30
Discharge: HOME | End: 2025-03-01
Attending: EMERGENCY MEDICINE
Payer: COMMERCIAL

## 2025-03-01 ENCOUNTER — NURSE TRIAGE (OUTPATIENT)
Dept: ADMINISTRATIVE | Facility: CLINIC | Age: 30
End: 2025-03-01
Payer: COMMERCIAL

## 2025-03-01 VITALS
TEMPERATURE: 99 F | OXYGEN SATURATION: 98 % | WEIGHT: 172.81 LBS | BODY MASS INDEX: 28.76 KG/M2 | DIASTOLIC BLOOD PRESSURE: 76 MMHG | RESPIRATION RATE: 16 BRPM | HEART RATE: 72 BPM | SYSTOLIC BLOOD PRESSURE: 122 MMHG

## 2025-03-01 DIAGNOSIS — D64.9 CHRONIC ANEMIA: ICD-10-CM

## 2025-03-01 DIAGNOSIS — N92.0 MENORRHAGIA WITH REGULAR CYCLE: Primary | ICD-10-CM

## 2025-03-01 DIAGNOSIS — Z98.51 HISTORY OF BILATERAL TUBAL LIGATION: ICD-10-CM

## 2025-03-01 LAB
ABO + RH BLD: NORMAL
ALBUMIN SERPL BCP-MCNC: 4 G/DL (ref 3.5–5.2)
ALP SERPL-CCNC: 63 U/L (ref 40–150)
ALT SERPL W/O P-5'-P-CCNC: 15 U/L (ref 10–44)
ANION GAP SERPL CALC-SCNC: 9 MMOL/L (ref 8–16)
AST SERPL-CCNC: 18 U/L (ref 10–40)
B-HCG UR QL: NEGATIVE
BASOPHILS # BLD AUTO: 0.12 K/UL (ref 0–0.2)
BASOPHILS NFR BLD: 1.8 % (ref 0–1.9)
BILIRUB SERPL-MCNC: 0.2 MG/DL (ref 0.1–1)
BILIRUB UR QL STRIP: NEGATIVE
BLD GP AB SCN CELLS X3 SERPL QL: NORMAL
BUN SERPL-MCNC: 9 MG/DL (ref 6–20)
CALCIUM SERPL-MCNC: 9.3 MG/DL (ref 8.7–10.5)
CHLORIDE SERPL-SCNC: 107 MMOL/L (ref 95–110)
CLARITY UR: CLEAR
CO2 SERPL-SCNC: 24 MMOL/L (ref 23–29)
COLOR UR: COLORLESS
CREAT SERPL-MCNC: 0.9 MG/DL (ref 0.5–1.4)
DIFFERENTIAL METHOD BLD: ABNORMAL
EOSINOPHIL # BLD AUTO: 0.1 K/UL (ref 0–0.5)
EOSINOPHIL NFR BLD: 0.8 % (ref 0–8)
ERYTHROCYTE [DISTWIDTH] IN BLOOD BY AUTOMATED COUNT: 16.5 % (ref 11.5–14.5)
EST. GFR  (NO RACE VARIABLE): >60 ML/MIN/1.73 M^2
GLUCOSE SERPL-MCNC: 93 MG/DL (ref 70–110)
GLUCOSE UR QL STRIP: NEGATIVE
HCT VFR BLD AUTO: 31.2 % (ref 37–48.5)
HGB BLD-MCNC: 9.7 G/DL (ref 12–16)
HGB UR QL STRIP: ABNORMAL
IMM GRANULOCYTES # BLD AUTO: 0.03 K/UL (ref 0–0.04)
IMM GRANULOCYTES NFR BLD AUTO: 0.5 % (ref 0–0.5)
KETONES UR QL STRIP: NEGATIVE
LEUKOCYTE ESTERASE UR QL STRIP: ABNORMAL
LYMPHOCYTES # BLD AUTO: 2.5 K/UL (ref 1–4.8)
LYMPHOCYTES NFR BLD: 37 % (ref 18–48)
MCH RBC QN AUTO: 25.4 PG (ref 27–31)
MCHC RBC AUTO-ENTMCNC: 31.1 G/DL (ref 32–36)
MCV RBC AUTO: 82 FL (ref 82–98)
MICROSCOPIC COMMENT: ABNORMAL
MONOCYTES # BLD AUTO: 0.5 K/UL (ref 0.3–1)
MONOCYTES NFR BLD: 7.1 % (ref 4–15)
NEUTROPHILS # BLD AUTO: 3.5 K/UL (ref 1.8–7.7)
NEUTROPHILS NFR BLD: 52.8 % (ref 38–73)
NITRITE UR QL STRIP: NEGATIVE
NRBC BLD-RTO: 0 /100 WBC
PH UR STRIP: 6 [PH] (ref 5–8)
PLATELET # BLD AUTO: 466 K/UL (ref 150–450)
PMV BLD AUTO: 9.5 FL (ref 9.2–12.9)
POTASSIUM SERPL-SCNC: 3.7 MMOL/L (ref 3.5–5.1)
PROT SERPL-MCNC: 7.2 G/DL (ref 6–8.4)
PROT UR QL STRIP: NEGATIVE
RBC # BLD AUTO: 3.82 M/UL (ref 4–5.4)
RBC #/AREA URNS HPF: >100 /HPF (ref 0–4)
SODIUM SERPL-SCNC: 140 MMOL/L (ref 136–145)
SP GR UR STRIP: 1.02 (ref 1–1.03)
SPECIMEN OUTDATE: NORMAL
URN SPEC COLLECT METH UR: ABNORMAL
UROBILINOGEN UR STRIP-ACNC: NEGATIVE EU/DL
WBC # BLD AUTO: 6.63 K/UL (ref 3.9–12.7)
WBC #/AREA URNS HPF: 14 /HPF (ref 0–5)

## 2025-03-01 PROCEDURE — 36415 COLL VENOUS BLD VENIPUNCTURE: CPT | Performed by: EMERGENCY MEDICINE

## 2025-03-01 PROCEDURE — 86901 BLOOD TYPING SEROLOGIC RH(D): CPT

## 2025-03-01 PROCEDURE — 81025 URINE PREGNANCY TEST: CPT | Performed by: EMERGENCY MEDICINE

## 2025-03-01 PROCEDURE — 85025 COMPLETE CBC W/AUTO DIFF WBC: CPT

## 2025-03-01 PROCEDURE — 87086 URINE CULTURE/COLONY COUNT: CPT

## 2025-03-01 PROCEDURE — 81000 URINALYSIS NONAUTO W/SCOPE: CPT

## 2025-03-01 PROCEDURE — 80053 COMPREHEN METABOLIC PANEL: CPT

## 2025-03-01 PROCEDURE — 99283 EMERGENCY DEPT VISIT LOW MDM: CPT | Mod: 25

## 2025-03-01 NOTE — ED PROVIDER NOTES
SCRIBE #1 NOTE: I, Esther Sam, am scribing for, and in the presence of, Yaneli Vegas MD. I have scribed the entire note.       History     Chief Complaint   Patient presents with    Post-op Problem     Had tubal ligation done on  and started bleeding this morning, reports bleeding is heavier than normal period. Has used 4 tampons today. Reports right lower quadrant  sharp pain.     Review of patient's allergies indicates:   Allergen Reactions    Lactase Diarrhea    Milk containing products (dairy) Diarrhea and Nausea And Vomiting         History of Present Illness     HPI    3/1/2025, 12:44 PM  History obtained from the patient and medical records      History of Present Illness: Zoila Sheldon is a 29 y.o. female patient with a PMHx of pituitary tumor, bipolar disorder, and anemia who presents to the Emergency Department for evaluation of vaginal bleeding. Patient is s/p laparoscopic salpingectomy on 2025. She reports heavy vaginal bleeding which began today, stating she had bled through 3 pairs of undergarments. Pt affirms heavy menstrual cycles at baseline, however, she typically will only go through 1 tampon in 2 hours and reports having gone through 1 tampon in 30 minutes today. She reports that her period was expected to start today. Symptoms are constant and moderate in severity. No mitigating or exacerbating factors reported. No associated symptoms reported, no dizziness, no lightheadedness, no syncope or presyncope. No further complaints or concerns at this time.       Arrival mode: Personal Transportation    PCP: No, Primary Doctor        Past Medical History:  Past Medical History:   Diagnosis Date    Anemia, unspecified     Anxiety disorder, unspecified     Bipolar disorder, unspecified     Borderline personality disorder     Depression     Pituitary tumor        Past Surgical History:  Past Surgical History:   Procedure Laterality Date    INDUCED       Surgical     LAPAROSCOPIC SALPINGECTOMY Bilateral 2/27/2025    Procedure: SALPINGECTOMY, LAPAROSCOPIC;  Surgeon: DEBBIE Will MD;  Location: [facility];  Service: OB/GYN;  Laterality: Bilateral;    [place] teeth surgery           Family History:  Family History   Problem Relation Name Age of Onset    Depression Mother      Anxiety Mother      ADD / ADHD Mother      Bipolar disorder Father      ADD / ADHD Sister      Prostate cancer Maternal Grandfather      Colon cancer Paternal Grandfather         Social History:  Social History     Tobacco Use    Smoking status: Never    Smokeless tobacco: Never   Substance and Sexual Activity    Alcohol use: Not Currently    Drug use: Yes     Types: Marijuana     Comment: smokes: 1gm/wk    Sexual activity: Not Currently     Partners: Male        Review of Systems     Review of Systems   Constitutional:  Negative for fever.   HENT:  Negative for sore throat.    Respiratory:  Negative for shortness of breath.    Cardiovascular:  Negative for chest pain.   Gastrointestinal:  Negative for nausea.   Genitourinary:  Positive for menstrual problem (irregularly heavy) and vaginal bleeding. Negative for dysuria.   Musculoskeletal:  Negative for back pain.   Skin:  Negative for rash.   Neurological:  Negative for weakness.   Hematological:  Does not bruise/bleed easily.   All other systems reviewed and are negative.     Physical Exam     Initial Vitals [03/01/25 1221]   BP Pulse Resp Temp SpO2   117/73 90 16 99.2 °F (37.3 °C) 99 %      MAP       --          Physical Exam  Nursing Notes and Vital Signs Reviewed.  Constitutional: Patient is in no apparent distress. Well-developed and well-nourished.  Head: Atraumatic. Normocephalic.  Eyes: PERRL. EOM intact. Conjunctivae are not pale. No scleral icterus.  ENT: Mucous membranes are moist. Oropharynx is clear and symmetric.    Neck: Supple. Full ROM. No lymphadenopathy.  Cardiovascular: Regular rate. Regular rhythm. No murmurs, rubs, or gallops.  Distal pulses are 2+ and symmetric.  Pulmonary/Chest: No respiratory distress. Clear to auscultation bilaterally. No wheezing or rales.  Abdominal: Soft and non-distended.  There is no tenderness.  No rebound, guarding, or rigidity. Good bowel sounds.   Well-healing trochar incision sites, mild tenderness noted on exam.   Genitourinary: No CVA tenderness  Musculoskeletal: Moves all extremities. No obvious deformities. No edema. No calf tenderness.  Skin: Warm and dry.  Neurological:  Alert, awake, and appropriate.  Normal speech.  No acute focal neurological deficits are appreciated.  Psychiatric: Normal affect. Good eye contact. Appropriate in content.     ED Course   Procedures  ED Vital Signs:  Vitals:    03/01/25 1221 03/01/25 1440   BP: 117/73 122/76   Pulse: 90 72   Resp: 16 16   Temp: 99.2 °F (37.3 °C) 98.8 °F (37.1 °C)   TempSrc: Oral Oral   SpO2: 99% 98%   Weight: 78.4 kg (172 lb 12.8 oz)        Abnormal Lab Results:  Labs Reviewed   CBC W/ AUTO DIFFERENTIAL - Abnormal       Result Value    WBC 6.63      RBC 3.82 (*)     Hemoglobin 9.7 (*)     Hematocrit 31.2 (*)     MCV 82      MCH 25.4 (*)     MCHC 31.1 (*)     RDW 16.5 (*)     Platelets 466 (*)     MPV 9.5      Immature Granulocytes 0.5      Gran # (ANC) 3.5      Immature Grans (Abs) 0.03      Lymph # 2.5      Mono # 0.5      Eos # 0.1      Baso # 0.12      nRBC 0      Gran % 52.8      Lymph % 37.0      Mono % 7.1      Eosinophil % 0.8      Basophil % 1.8      Differential Method Automated     COMPREHENSIVE METABOLIC PANEL    Sodium 140      Potassium 3.7      Chloride 107      CO2 24      Glucose 93      BUN 9      Creatinine 0.9      Calcium 9.3      Total Protein 7.2      Albumin 4.0      Total Bilirubin 0.2      Alkaline Phosphatase 63      AST 18      ALT 15      eGFR >60      Anion Gap 9     URINALYSIS, REFLEX TO URINE CULTURE   PREGNANCY TEST, URINE RAPID   TYPE & SCREEN    Group & Rh B POS      Indirect Felipe NEG      Specimen Outdate 03/04/2025  23:59          All Lab Results:  Results for orders placed or performed during the hospital encounter of 03/01/25   CBC W/ AUTO DIFFERENTIAL    Collection Time: 03/01/25 12:44 PM   Result Value Ref Range    WBC 6.63 3.90 - 12.70 K/uL    RBC 3.82 (L) 4.00 - 5.40 M/uL    Hemoglobin 9.7 (L) 12.0 - 16.0 g/dL    Hematocrit 31.2 (L) 37.0 - 48.5 %    MCV 82 82 - 98 fL    MCH 25.4 (L) 27.0 - 31.0 pg    MCHC 31.1 (L) 32.0 - 36.0 g/dL    RDW 16.5 (H) 11.5 - 14.5 %    Platelets 466 (H) 150 - 450 K/uL    MPV 9.5 9.2 - 12.9 fL    Immature Granulocytes 0.5 0.0 - 0.5 %    Gran # (ANC) 3.5 1.8 - 7.7 K/uL    Immature Grans (Abs) 0.03 0.00 - 0.04 K/uL    Lymph # 2.5 1.0 - 4.8 K/uL    Mono # 0.5 0.3 - 1.0 K/uL    Eos # 0.1 0.0 - 0.5 K/uL    Baso # 0.12 0.00 - 0.20 K/uL    nRBC 0 0 /100 WBC    Gran % 52.8 38.0 - 73.0 %    Lymph % 37.0 18.0 - 48.0 %    Mono % 7.1 4.0 - 15.0 %    Eosinophil % 0.8 0.0 - 8.0 %    Basophil % 1.8 0.0 - 1.9 %    Differential Method Automated    Comp. Metabolic Panel    Collection Time: 03/01/25 12:44 PM   Result Value Ref Range    Sodium 140 136 - 145 mmol/L    Potassium 3.7 3.5 - 5.1 mmol/L    Chloride 107 95 - 110 mmol/L    CO2 24 23 - 29 mmol/L    Glucose 93 70 - 110 mg/dL    BUN 9 6 - 20 mg/dL    Creatinine 0.9 0.5 - 1.4 mg/dL    Calcium 9.3 8.7 - 10.5 mg/dL    Total Protein 7.2 6.0 - 8.4 g/dL    Albumin 4.0 3.5 - 5.2 g/dL    Total Bilirubin 0.2 0.1 - 1.0 mg/dL    Alkaline Phosphatase 63 40 - 150 U/L    AST 18 10 - 40 U/L    ALT 15 10 - 44 U/L    eGFR >60 >60 mL/min/1.73 m^2    Anion Gap 9 8 - 16 mmol/L   Type & Screen    Collection Time: 03/01/25 12:44 PM   Result Value Ref Range    Group & Rh B POS     Indirect Felipe NEG     Specimen Outdate 03/04/2025 23:59        Imaging Results:  Imaging Results    None          No EKG ordered.           The Emergency Provider reviewed the vital signs and test results, which are outlined above.     ED Discussion     2:36 PM: Reassessed pt at this time. Discussed  with patient and/or family/caretaker all pertinent ED information and results. Discussed pt dx and plan of tx. Gave the patient all f/u and return to the ED instructions. All questions and concerns were addressed at this time. Patient and/or family/caretaker expresses understanding of information and instructions, and is comfortable with plan to discharge. Pt is stable for discharge.     I discussed with patient and/or family/caretaker that evaluation in the ED does not suggest any emergent or life threatening medical conditions requiring immediate intervention beyond what was provided in the ED, and I believe patient is safe for discharge.  Regardless, an unremarkable evaluation in the ED does not preclude the development or presence of a serious of life threatening condition. As such, I instructed that the patient is to return immediately for any worsening or change in current symptoms.       Medical Decision Making  DDX: 1. Menorrhagia 2. DUB 3. Post operative bleeding    Lab work reviewed h/h stable, wbc normal, VSS, surgical incision sites healing, reassurance provided     Amount and/or Complexity of Data Reviewed  Labs: ordered. Decision-making details documented in ED Course.                ED Medication(s):  Medications - No data to display    New Prescriptions    No medications on file        Follow-up Information       DEBBIE Will MD. Schedule an appointment as soon as possible for a visit in 2 days.    Specialties: Obstetrics, Obstetrics and Gynecology  Why: Return to the Emergency Room, If symptoms worsen  Contact information:  [id number] [facility] [address] [phone]                                 Scribe Attestation:   Scribe #1: I performed the above scribed service and the documentation accurately describes the services I performed. I attest to the accuracy of the note.     Attending:   Physician Attestation Statement for Scribe #1: [employer] STREETER Ashlyn K., MD,  personally performed the services described in this documentation, as scribed by Esther Sam, in my presence, and it is both accurate and complete.           Clinical Impression       ICD-10-CM ICD-9-CM   1. Menorrhagia with regular cycle  N92.0 626.2   2. History of bilateral tubal ligation  Z98.51 V26.51   3. Chronic anemia  D64.9 285.9       Disposition:   Disposition: Discharged  Condition: Stable       Yaneli Vegas MD  03/03/25 2005

## 2025-03-01 NOTE — TELEPHONE ENCOUNTER
Pt calling with c/o vaginal bleeding but is due for her period and she had a tubal ligation done a few days ago and was concerned. Pt told that periods can be a little heavier and triaged and care advice to see MD within 3 days and I will send a message to her provider for Monday. Pt told to call back if bleeding gets worse or if any fever or other needs or concerns                  Reason for Disposition   [1] Bleeding or spotting after procedure (e.g., biopsy) or pelvic examination (e.g., pap smear) AND [2] lasts > 7 days    Additional Information   Negative: Shock suspected (e.g., cold/pale/clammy skin, too weak to stand, low BP, rapid pulse)   Negative: Difficult to awaken or acting confused (e.g., disoriented, slurred speech)   Negative: Passed out (e.g., fainted, lost consciousness, blacked out and was not responding)   Negative: Sounds like a life-threatening emergency to the triager   Negative: SEVERE abdominal pain   Negative: SEVERE dizziness (e.g., unable to stand, requires support to walk, feels like passing out now)   Negative: SEVERE vaginal bleeding (e.g., soaking 2 pads or tampons per hour and present 2 or more hours; 1 menstrual cup every 2 hours)   Negative: Patient sounds very sick or weak to the triager   Negative: MODERATE vaginal bleeding (e.g., soaking 1 pad or tampon per hour and present > 6 hours; 1 menstrual cup every 6 hours)   Negative: [1] Constant abdominal pain AND [2] present > 2 hours   Negative: Pale skin (pallor) of new-onset or getting worse   Negative: Passed tissue (e.g., gray-white)   Negative: Taking Coumadin (warfarin) or other strong blood thinner, or known bleeding disorder (e.g., thrombocytopenia)   Negative: [1] Skin bruises or nosebleed AND [2] not caused by an injury   Negative: [1] Periods with > 6 soaked pads or tampons per day AND [2] last > 7 days    Protocols used: Vaginal Bleeding - [id number]

## 2025-03-01 NOTE — FIRST PROVIDER EVALUATION
Medical screening examination initiated.  I have conducted a focused provider triage encounter, findings are as follows:    Brief history of present illness:  30 yo female presents to ED with complaints of vaginal bleeding. Reports heavy bleeding soaking through 4 tampons since 7am this morning. Reports recent tubal ligation on Feb 27th. Reports associated fatigue    Vitals:    03/01/25 1221   BP: 117/73   BP Location: Right arm   Pulse: 90   Resp: 16   Temp: 99.2 °F (37.3 °C)   TempSrc: Oral   SpO2: 99%   Weight: 78.4 kg (172 lb 12.8 oz)       Pertinent physical exam:  vaginal bleeding    Brief workup plan:  workup, us    Preliminary workup initiated; this workup will be continued and followed by the physician or advanced practice provider that is assigned to the patient when roomed.

## 2025-03-02 LAB — BACTERIA UR CULT: NO GROWTH

## 2025-03-03 ENCOUNTER — PATIENT MESSAGE (OUTPATIENT)
Dept: PSYCHIATRY | Facility: CLINIC | Age: 30
End: 2025-03-03
Payer: COMMERCIAL

## 2025-03-03 DIAGNOSIS — F41.0 GENERALIZED ANXIETY DISORDER WITH PANIC ATTACKS: Primary | ICD-10-CM

## 2025-03-03 DIAGNOSIS — F43.10 POST TRAUMATIC STRESS DISORDER (PTSD): ICD-10-CM

## 2025-03-03 DIAGNOSIS — F41.1 GENERALIZED ANXIETY DISORDER WITH PANIC ATTACKS: Primary | ICD-10-CM

## 2025-03-03 RX ORDER — BUSPIRONE HYDROCHLORIDE 10 MG/1
10 TABLET ORAL 2 TIMES DAILY
Qty: 60 TABLET | Refills: 2 | Status: SHIPPED | OUTPATIENT
Start: 2025-03-03

## 2025-03-05 ENCOUNTER — RESULTS FOLLOW-UP (OUTPATIENT)
Dept: OBSTETRICS AND GYNECOLOGY | Facility: CLINIC | Age: 30
End: 2025-03-05

## 2025-03-05 LAB
FINAL PATHOLOGIC DIAGNOSIS: NORMAL
GROSS: NORMAL
Lab: NORMAL

## 2025-03-05 NOTE — PROGRESS NOTES
I have reviewed the pathology from your surgery and the results are benign, or normal as we expected.   Hope you are recovering well   Please contact our office if you have any needs or concerns  I look forward to seeing you   at your next visit  Dr Will

## 2025-03-06 DIAGNOSIS — F31.32 BIPOLAR AFFECTIVE DISORDER, CURRENTLY DEPRESSED, MODERATE: Primary | ICD-10-CM

## 2025-03-06 RX ORDER — LAMOTRIGINE 100 MG/1
100 TABLET ORAL DAILY
Qty: 30 TABLET | Refills: 2 | Status: SHIPPED | OUTPATIENT
Start: 2025-03-06 | End: 2025-06-04

## 2025-03-21 ENCOUNTER — PATIENT MESSAGE (OUTPATIENT)
Dept: PSYCHIATRY | Facility: CLINIC | Age: 30
End: 2025-03-21
Payer: COMMERCIAL

## 2025-03-21 ENCOUNTER — OFFICE VISIT (OUTPATIENT)
Dept: PSYCHIATRY | Facility: CLINIC | Age: 30
End: 2025-03-21
Payer: COMMERCIAL

## 2025-03-21 DIAGNOSIS — F31.32 BIPOLAR AFFECTIVE DISORDER, CURRENTLY DEPRESSED, MODERATE: Primary | ICD-10-CM

## 2025-03-21 PROCEDURE — 99499 UNLISTED E&M SERVICE: CPT | Mod: 95,,, | Performed by: PSYCHOLOGIST

## 2025-03-26 NOTE — PROGRESS NOTES
"MEDICATION MANAGEMENT SESSION: VIRTUAL    Name: Zoila Sheldon  Age: 29 y.o.  : 1995    Preferred Name: Zoila    Site: [facility]--via virtual visit with synchronous audio and video. Patient presented at home, in the [facility].   Each patient to whom medical services by telemedicine is provided is:   (1) informed of the relationship between the physician and patient and the respective role of any other health care provider with respect to management of the patient;   (2) notified that he or she may decline to receive medical services by telemedicine and may withdraw from such care at any time.    Referring provider: Nicky Collier PA-C    Reason for Visit:  Medication follow up    LAST VISIT 25:  Pt reports starting Lamictal 25 mg qd going well so far; she's now at 50 mg qd. Side effects--maybe headache, some nausea (twice) but these symptoms resolved after 2 days. She gets headaches with cycle so may be due to that. No other problems reported. She will continue monitoring for signs of rash. Mood swings are still happening but symptoms are not as severe, she feels calmer overall. Will increase to 75 mg qd on 25. Still having trouble completing things she starts. Appetite still low; noticed increased thirst.    Energy level continues to be fairly low--she says this is not a change from baseline. Goes to bed by 10:00 pm, wakes up tired but always been the case.    Scheduled for tubal ligation tomorrow; she knows it's the right choice, not ambivalent at all. Her mother will assist with recovery and childcare so she can recover.    PLAN:  Complete Lamictal 75 mg qd for 2 weeks, then increase to 100 mg qd; MONITOR FOR SIGNS OF RASH. IF RASH DEVELOPS, STOP MEDICATION IMMEDIATELY AND CONTACT PROVIDER;  Continue Klonopin 0.25 mg bid prn for anxiety and onset insomnia;  RTC in 4 weeks.     ADHD: "Think I have ADHD; can't focus, can't finish anything"   Depressive Disorder: " "angry mood, irritable mood, tired/fatigued, worthlessness, tearfulness/crying   Anxiety Disorder: anxiety/nervousness, hyperarousal symptoms, fatigue, irritability, excessive worry   Panic Disorder: nervous and rapid heart rate   Manic Disorder: PAST: expansive mood, increased distractability, decreased need for sleep, hyperverbal, racing thoughts, reckless/risk-taking behavior, don't think before acting, start lots of activities, drove to [place] to [facility] on a whim;  MOOD LABILITY CONTINUES BUT CALMER OVERALL.   Psychotic Disorder: denied   Substance Use:  Marijuana: smoke once per week but makes tired     INTERIM COMMUNICATION:  Zoila Sheldon to MILIND Estes Alyssa Staff (supporting You) 3/3/25 11:27 AM:  Hi doctor Franklyn i was wondering if theres something other than the klonopin to help with anxiety. I understand that its for panic attacks and to help calm me at the moment but i think i need something that helps with constant anxiety. I dont know how to put it into any other words other than I feel like i am going to combust because of how overwhelmed i feel. I cant stop crying and i am not necessarily sad but i am getting nauseated from the headaches i am getting from the constant crying. I dont know what to do    Me to Zoila Sheldon 3/3/25  3:19 PM:  Farhat Cummings, I sent an order to your pharmacy for Buspirone 10 mg (take 1 tablet 2 times daily) to take every day for anxiety. Please let me know if you have any questions.    CURRENT VISIT:  Pt reports she feels fine at Lamictal 100 mg qd, reports no side effects. Having fewer outbursts, not angry, less irritable, mood is more stable   "everyone around me notices a difference." Currently coping well with work situation--her employer/company is closing so she won't have job as of this summer--"I'm handling it well now after crying for a bit." Reports she's a little anxious because she has a lot to do but procrastinating; also " "home caring for her 3 y.o. Buspar 10 mg bid seems to be helping with anxiety symptoms. Energy level continues to be fairly low--she says this is not a change from baseline. Goes to bed by 10:00 pm, wakes up tired but always been the case.    Pt wants to discuss ADHD--she reports being forgetful, fidgety, easily distracted, inattentive/unfocused, over-talkative at time, trouble completing tasks she starts, avoiding/delaying effortful tasks, difficulty waiting her turn in line, conversation, not listening in conversations, constantly feeling driven/on the go. Reports she's struggled with symptoms since childhood, poor attention in class and "bothered everyone else" when done with work.    Pt was administered the Adult ADHD Self-Report Scale (ASRS), specific item responses included below in assessment section: Total score = 68, consistent with diagnosis of ADHD in adults.  Part A score 22 = Very High (18+): Part A scores are most predictive of an ADHD diagnosis (Blackwood et al., 2007) and therefore have the most screening and diagnostic utility. If the respondent scores 14 or more in Part A, the symptom profile is consistent with a DSM-5-TR ADHD diagnosis in adults (Sarthak et al., 2006; Blackwood et al., 2007). The Part A descriptor provides an indication of whether the respondent meets the DSM criteria, with scores in the high or very high range being considered clinically significant.    Part B score 46 = Very High (33+): Part B scores provide additional information about a broader set of ADHD symptom severity and the impact that inattention or hyperactivity has on their life. A descriptor in the high or very high range (27 or above) is clinically significant.    PLAN:  Start Vyvanse 20 mg qd;  Continue Lamictal 100 mg qd;  Continue Buspar 10 mg bid;  Continue Klonopin;  RTC in 4 weeks.    Current symptoms:   ADHD: fidgety, on the go/driven, overtalkative, can't wait turn, inattentive, not listening, no follow-through, " avoids effortful tasks, forgetful, easily distracted   Depressive Disorder: irritable mood, tired/fatigued, concentration problems   Anxiety Disorder: anxiety/nervousness, hyperarousal symptoms, fatigue, excessive worry, poor concentration   Panic Disorder: nervous and rapid heart rate   Manic Disorder: denied   Psychotic Disorder: denied   Substance Use:  Marijuana: smoke once per week but makes tired     Review of Systems   Constitutional:  Positive for fatigue. Negative for activity change, appetite change and unexpected weight change.   Respiratory:  Negative for shortness of breath.    Psychiatric/Behavioral:  Positive for decreased concentration. Negative for agitation, behavioral problems, confusion, dysphoric mood, hallucinations, self-injury, sleep disturbance and suicidal ideas. The patient is nervous/anxious. The patient is not hyperactive.       ADULT ADHD SELF-REPORT SCALE:   03/27/25 1146   ADULT ADHD Part A   How often do you have trouble wrapping up the final details of a project once the challenging parts have been done? 3   How often do you have difficulty getting things in order when you have to do a task that requires organization? 4   How often do you have problems remembering appointments or obligations? 4   When you have a task that requires a lot of thought, how often do you avoid or delay getting started? 4   How often do you fidget or squirm with your hands or feet when you have to sit down for a long time? 4   How often do you feel overly active and compelled to do things, like you were driven by a motor? 3   Part A Score 22   ADULT ADHD Part B   How often do you make careless mistakes when you have to work on a boring or difficult project? 4   How often do you have difficulty keeping your attention when you are doing boring or repetitive work? 4   How often do you have difficulty concentrating on what people say to you, even when they are speaking to you directly? 4   How often do you  misplace or have difficulty finding things at home or at work? 3   How often are you distracted by activity or noise around you? 4   How often do you leave your seat in meetings or other situations in which you are expected to remain seated? 4   How often do you feel restless or fidgety? 4   How often do you have difficulty unwinding and relaxing when you have time to yourself? 4   How often do you find yourself talking too much when you are in social situations? 4   When you're in a conversation, how often do you find yourself finishing the sentences of the people you are talking to before they can finish them themselves? 4   How often do you have difficulty waiting your turn in situations when turn taking is required? 4   How often do you interrupt others when they are busy? 3   Part B Score 46   The Adult ADHD Self-Report Scale (ASRS v1.1) is an 18-item self-report questionnaire designed to assess Attention Deficit Hyperactivity Disorder (ADHD) symptoms in adults (18+). This scale is based on the World Health Organization Composite International Diagnostic Interview (2001), and the questions are consistent with both DSM-IV and DSM-5-TR criteria, specifically worded to reflect symptom manifestation in adults (Blackwood et al., 2005). This scale is useful for screening and diagnosis of ADHD among adults 18+ and should be used in conjunction with a clinical interview to provide additional clinical information.   Part A scores are most predictive of an ADHD diagnosis (Blackwood et al., 2007) and therefore have the most screening and diagnostic utility. However, other aspects of the ASRS can aid in the considerations around applying an ADHD diagnosis based upon overall consistency or differences observed between parts or subscales. If the respondent scores 14 or more in Part-A, then the symptom profile of the individual is consistent with a DSM-5-TR ADHD diagnosis in adults (Sarthak et al., 2006; Blackwood et al., 2007). The  "Part A descriptor provides an indication of whether the respondent meets the DSM criteria, with scores in the high or very high range being considered clinically significant:   Low: 9 or less   Mild to Moderate: 10-13   High: 14-17   Very High: 18 or more  Additional Symptoms Part B (items 7-18. Scores range from 0 to 48). Part B scores provide additional information about a broader set of ADHD symptom severity and the impact that inattention or hyperactivity has on their life. A descriptor in the high or very high range (27 or above) is clinically significant:   Low: 19 or less   Mild to Moderate: 20-26   High: 27-32   Very High: 33 or more  Reference: RANULFO Blackwood., YESICA De León., KIP Mcneil, TABITHA Cunningham, PEPITO Drew, TESS Phillips, KIP Salgado., RANULFO Nelson, STEPHANIE Scott, ALVINA Lindo, ALVINA Oliver., & Estevan, TESS HURST. (2005). The World Health Organization Adult ADHD Self-Report Scale (ASRS): a short screening scale for use in the general population. Psychological Medicine, 35(2), 245-256. https://doi.org/10.1017/c4791360604843496    Constitutional:  Vitals:  Most recent vital signs were reviewed.   Last 3 sets of Vitals        2/18/2025     4:09 PM 2/27/2025     9:50 AM 3/1/2025    12:21 PM   Vitals - 1 value per visit   SYSTOLIC  131 117   DIASTOLIC  73 73   Pulse  91 90   Temp  97 °F (36.1 °C) 99.2 °F (37.3 °C)   Resp  14 16   SPO2  100 % 99 %   Weight (lb) 174  172.8   Weight (kg) 78.926  78.382   Height 5' 5" (1.651 m)     BMI (Calculated) 29            Psychiatric:  Oriented: x 3   Attitude: cooperative   Eye Contact: good   Behavior: wnl   Mood: "okay"  Affect: appropriate range   Attention: intact   Concentration: grossly intact   Thought Process: goal directed   Speech: intelligible  Volume: WNL   Quantity: WNL   Rhythm: WNL  Insight: fair to good   Threats: no SI / HI   Memory: Intact  Psychosis: denies all   Estimate of Intellectual Function: average   Judgment:  fair to good  Relevant Elements of Neurological Exam: " normal gait     Allergy Review:   Review of patient's allergies indicates:   Allergen Reactions    Lactase Diarrhea    Milk containing products (dairy) Diarrhea and Nausea And Vomiting      Medical Problem List:   Problem List[1]     Encounter Diagnoses   Name Primary?    Bipolar affective disorder, currently depressed, moderate     Generalized anxiety disorder with panic attacks     Post traumatic stress disorder (PTSD)     Borderline personality disorder     Attention deficit hyperactivity disorder (ADHD) evaluation     Attention deficit hyperactivity disorder (ADHD), combined type Yes      PLAN:  Medication Management: Continue current medications. Discussed risks, benefits, and alternatives to treatment plan documented above with patient. I answered all patient questions related to this plan, and patient expressed understanding and agreement.      Follow up in about 4 weeks (around 4/24/2025) for Medication follow up.     Medication List with Changes/Refills   New Medications    LISDEXAMFETAMINE (VYVANSE) 40 MG CAP    Take 1 capsule (40 mg total) by mouth every morning.   Current Medications    BUSPIRONE (BUSPAR) 10 MG TABLET    Take 1 tablet (10 mg total) by mouth 2 (two) times daily.    CLONAZEPAM (KLONOPIN) 0.5 MG TABLET    Take 0.5 tablets (0.25 mg total) by mouth 2 (two) times daily as needed for Anxiety.    FERROUS GLUCONATE (FERGON) 324 MG TABLET    Take 1 tablet (324 mg total) by mouth daily with breakfast.    LAMOTRIGINE (LAMICTAL) 100 MG TABLET    Take 1 tablet (100 mg total) by mouth once daily.      Time spent with pt including note preparation: 50 minutes     Alyssa Estes, PhD, MP  Medical Psychologist         [1]   Patient Active Problem List  Diagnosis    Encounter for consultation for female sterilization    Bipolar affective disorder, currently depressed, moderate    Borderline personality disorder    Pituitary tumor    Hypokalemia    Iron deficiency anemia    PTSD (post-traumatic stress  disorder)    ALEXIA (generalized anxiety disorder)

## 2025-03-27 ENCOUNTER — OFFICE VISIT (OUTPATIENT)
Dept: PSYCHIATRY | Facility: CLINIC | Age: 30
End: 2025-03-27
Payer: COMMERCIAL

## 2025-03-27 DIAGNOSIS — Z13.39 ATTENTION DEFICIT HYPERACTIVITY DISORDER (ADHD) EVALUATION: ICD-10-CM

## 2025-03-27 DIAGNOSIS — F60.3 BORDERLINE PERSONALITY DISORDER: ICD-10-CM

## 2025-03-27 DIAGNOSIS — F41.0 GENERALIZED ANXIETY DISORDER WITH PANIC ATTACKS: ICD-10-CM

## 2025-03-27 DIAGNOSIS — F31.32 BIPOLAR AFFECTIVE DISORDER, CURRENTLY DEPRESSED, MODERATE: ICD-10-CM

## 2025-03-27 DIAGNOSIS — F43.10 POST TRAUMATIC STRESS DISORDER (PTSD): ICD-10-CM

## 2025-03-27 DIAGNOSIS — F90.2 ATTENTION DEFICIT HYPERACTIVITY DISORDER (ADHD), COMBINED TYPE: Primary | ICD-10-CM

## 2025-03-27 DIAGNOSIS — F41.1 GENERALIZED ANXIETY DISORDER WITH PANIC ATTACKS: ICD-10-CM

## 2025-03-27 RX ORDER — LISDEXAMFETAMINE DIMESYLATE 20 MG/1
20 CAPSULE ORAL EVERY MORNING
Qty: 30 CAPSULE | Refills: 0 | Status: SHIPPED | OUTPATIENT
Start: 2025-03-27 | End: 2025-04-04

## 2025-04-04 ENCOUNTER — PATIENT MESSAGE (OUTPATIENT)
Dept: PSYCHIATRY | Facility: CLINIC | Age: 30
End: 2025-04-04
Payer: COMMERCIAL

## 2025-04-04 DIAGNOSIS — F90.2 ATTENTION DEFICIT HYPERACTIVITY DISORDER (ADHD), COMBINED TYPE: ICD-10-CM

## 2025-04-04 RX ORDER — LISDEXAMFETAMINE DIMESYLATE 40 MG/1
40 CAPSULE ORAL EVERY MORNING
Qty: 30 CAPSULE | Refills: 0 | Status: SHIPPED | OUTPATIENT
Start: 2025-04-04

## 2025-04-28 ENCOUNTER — PATIENT MESSAGE (OUTPATIENT)
Dept: SURGERY | Facility: HOSPITAL | Age: 30
End: 2025-04-28
Payer: COMMERCIAL

## 2025-05-02 ENCOUNTER — PATIENT MESSAGE (OUTPATIENT)
Dept: PSYCHIATRY | Facility: CLINIC | Age: 30
End: 2025-05-02
Payer: COMMERCIAL

## 2025-05-03 DIAGNOSIS — F43.10 POST TRAUMATIC STRESS DISORDER (PTSD): ICD-10-CM

## 2025-05-03 DIAGNOSIS — F41.1 GENERALIZED ANXIETY DISORDER WITH PANIC ATTACKS: ICD-10-CM

## 2025-05-03 DIAGNOSIS — F41.0 GENERALIZED ANXIETY DISORDER WITH PANIC ATTACKS: ICD-10-CM

## 2025-05-03 RX ORDER — CLONAZEPAM 0.5 MG/1
0.5 TABLET ORAL 2 TIMES DAILY PRN
Qty: 30 TABLET | Refills: 2 | Status: SHIPPED | OUTPATIENT
Start: 2025-05-03 | End: 2025-08-01

## 2025-05-06 ENCOUNTER — LAB VISIT (OUTPATIENT)
Dept: LAB | Facility: HOSPITAL | Age: 30
End: 2025-05-06
Attending: FAMILY MEDICINE
Payer: COMMERCIAL

## 2025-05-06 ENCOUNTER — OFFICE VISIT (OUTPATIENT)
Dept: PSYCHIATRY | Facility: CLINIC | Age: 30
End: 2025-05-06
Payer: COMMERCIAL

## 2025-05-06 DIAGNOSIS — Z13.39 ATTENTION DEFICIT HYPERACTIVITY DISORDER (ADHD) EVALUATION: Primary | ICD-10-CM

## 2025-05-06 DIAGNOSIS — G47.00 INSOMNIA, UNSPECIFIED TYPE: ICD-10-CM

## 2025-05-06 DIAGNOSIS — F43.10 POST TRAUMATIC STRESS DISORDER (PTSD): ICD-10-CM

## 2025-05-06 DIAGNOSIS — F60.3 BORDERLINE PERSONALITY DISORDER: ICD-10-CM

## 2025-05-06 DIAGNOSIS — F31.32 BIPOLAR AFFECTIVE DISORDER, CURRENTLY DEPRESSED, MODERATE: ICD-10-CM

## 2025-05-06 DIAGNOSIS — F41.1 GENERALIZED ANXIETY DISORDER WITH PANIC ATTACKS: ICD-10-CM

## 2025-05-06 DIAGNOSIS — D50.9 IRON DEFICIENCY ANEMIA, UNSPECIFIED IRON DEFICIENCY ANEMIA TYPE: ICD-10-CM

## 2025-05-06 DIAGNOSIS — F41.0 GENERALIZED ANXIETY DISORDER WITH PANIC ATTACKS: ICD-10-CM

## 2025-05-06 LAB
ABSOLUTE EOSINOPHIL (OHS): 0.05 K/UL
ABSOLUTE MONOCYTE (OHS): 0.47 K/UL (ref 0.3–1)
ABSOLUTE NEUTROPHIL COUNT (OHS): 4.28 K/UL (ref 1.8–7.7)
BASOPHILS # BLD AUTO: 0.07 K/UL
BASOPHILS NFR BLD AUTO: 1 %
ERYTHROCYTE [DISTWIDTH] IN BLOOD BY AUTOMATED COUNT: 17.9 % (ref 11.5–14.5)
FERRITIN SERPL-MCNC: 6 NG/ML (ref 20–300)
HCT VFR BLD AUTO: 32.9 % (ref 37–48.5)
HGB BLD-MCNC: 9.9 GM/DL (ref 12–16)
IMM GRANULOCYTES # BLD AUTO: 0.02 K/UL (ref 0–0.04)
IMM GRANULOCYTES NFR BLD AUTO: 0.3 % (ref 0–0.5)
IRON SATN MFR SERPL: 4 % (ref 20–50)
IRON SERPL-MCNC: 19 UG/DL (ref 30–160)
LYMPHOCYTES # BLD AUTO: 1.91 K/UL (ref 1–4.8)
MCH RBC QN AUTO: 25.1 PG (ref 27–31)
MCHC RBC AUTO-ENTMCNC: 30.1 G/DL (ref 32–36)
MCV RBC AUTO: 83 FL (ref 82–98)
NUCLEATED RBC (/100WBC) (OHS): 0 /100 WBC
PLATELET # BLD AUTO: 544 K/UL (ref 150–450)
PMV BLD AUTO: 9.6 FL (ref 9.2–12.9)
RBC # BLD AUTO: 3.95 M/UL (ref 4–5.4)
RELATIVE EOSINOPHIL (OHS): 0.7 %
RELATIVE LYMPHOCYTE (OHS): 28.1 % (ref 18–48)
RELATIVE MONOCYTE (OHS): 6.9 % (ref 4–15)
RELATIVE NEUTROPHIL (OHS): 63 % (ref 38–73)
TIBC SERPL-MCNC: 434 UG/DL (ref 250–450)
TRANSFERRIN SERPL-MCNC: 293 MG/DL (ref 200–375)
WBC # BLD AUTO: 6.8 K/UL (ref 3.9–12.7)

## 2025-05-06 PROCEDURE — 1159F MED LIST DOCD IN RCRD: CPT | Mod: CPTII,95,, | Performed by: PSYCHOLOGIST

## 2025-05-06 PROCEDURE — 82728 ASSAY OF FERRITIN: CPT

## 2025-05-06 PROCEDURE — 98006 SYNCH AUDIO-VIDEO EST MOD 30: CPT | Mod: 95,,, | Performed by: PSYCHOLOGIST

## 2025-05-06 PROCEDURE — 85025 COMPLETE CBC W/AUTO DIFF WBC: CPT

## 2025-05-06 PROCEDURE — 83540 ASSAY OF IRON: CPT

## 2025-05-06 PROCEDURE — 3044F HG A1C LEVEL LT 7.0%: CPT | Mod: CPTII,95,, | Performed by: PSYCHOLOGIST

## 2025-05-06 PROCEDURE — 36415 COLL VENOUS BLD VENIPUNCTURE: CPT

## 2025-05-06 RX ORDER — CLONAZEPAM 0.5 MG/1
0.5 TABLET ORAL 2 TIMES DAILY PRN
Qty: 30 TABLET | Refills: 2 | Status: SHIPPED | OUTPATIENT
Start: 2025-05-06 | End: 2025-08-04

## 2025-05-06 RX ORDER — ATOMOXETINE 40 MG/1
40 CAPSULE ORAL DAILY
Qty: 30 CAPSULE | Refills: 2 | Status: SHIPPED | OUTPATIENT
Start: 2025-05-06 | End: 2025-08-04

## 2025-05-06 RX ORDER — LAMOTRIGINE 100 MG/1
100 TABLET ORAL DAILY
Qty: 30 TABLET | Refills: 2 | Status: SHIPPED | OUTPATIENT
Start: 2025-05-06 | End: 2025-08-04

## 2025-05-06 RX ORDER — BUSPIRONE HYDROCHLORIDE 10 MG/1
10 TABLET ORAL 2 TIMES DAILY
Qty: 60 TABLET | Refills: 2 | Status: SHIPPED | OUTPATIENT
Start: 2025-05-06

## 2025-05-06 NOTE — PROGRESS NOTES
"MEDICATION MANAGEMENT SESSION: VIRTUAL    Name: Zoila Sheldon  Age: 29 y.o.  : 1995    Preferred Name: Zoila    Site: [facility]--via virtual visit with synchronous audio and video. Patient presented at home, in the [facility].   Each patient to whom medical services by telemedicine is provided is:   (1) informed of the relationship between the physician and patient and the respective role of any other health care provider with respect to management of the patient;   (2) notified that he or she may decline to receive medical services by telemedicine and may withdraw from such care at any time.    Referring provider: Nicky Collier PA-C    Reason for Visit:  Medication follow up    LAST VISIT 3/27/25:  Pt reports she feels fine at Lamictal 100 mg qd, reports no side effects. Having fewer outbursts, not angry, less irritable, mood is more stable   "everyone around me notices a difference." Currently coping well with work situation--her employer/company is closing so she won't have job as of this summer--"I'm handling it well now after crying for a bit." Reports she's a little anxious because she has a lot to do but procrastinating; also home caring for her 3 y.o. Buspar 10 mg bid seems to be helping with anxiety symptoms. Energy level continues to be fairly low--she says this is not a change from baseline. Goes to bed by 10:00 pm, wakes up tired but always been the case.    Pt wants to discuss ADHD--she reports being forgetful, fidgety, easily distracted, inattentive/unfocused, over-talkative at time, trouble completing tasks she starts, avoiding/delaying effortful tasks, difficulty waiting her turn in line, conversation, not listening in conversations, constantly feeling driven/on the go. Reports she's struggled with symptoms since childhood, poor attention in class and "bothered everyone else" when done with work.    Pt was administered the Adult ADHD Self-Report Scale (ASRS), " specific item responses included below in assessment section:   Total score = 68, consistent with diagnosis of ADHD in adults.  Part A score 22 = Very High (18+): Part A scores are most predictive of an ADHD diagnosis (Jaison et al., 2007) and therefore have the most screening and diagnostic utility. If the respondent scores 14 or more in Part A, the symptom profile is consistent with a DSM-5-TR ADHD diagnosis in adults (Sarthak et al., 2006; Blackwood et al., 2007). The Part A descriptor provides an indication of whether the respondent meets the DSM criteria, with scores in the high or very high range being considered clinically significant.  Part B score 46 = Very High (33+): Part B scores provide additional information about a broader set of ADHD symptom severity and the impact that inattention or hyperactivity has on their life. A descriptor in the high or very high range (27 or above) is clinically significant.    PLAN:  Start Vyvanse 20 mg qd;  Continue Lamictal 100 mg qd;  Continue Buspar 10 mg bid;  Continue Klonopin;  RTC in 4 weeks.     ADHD: fidgety, on the go/driven, overtalkative, can't wait turn, inattentive, not listening, no follow-through, avoids effortful tasks, forgetful, easily distracted   Depressive Disorder: irritable mood, tired/fatigued, concentration problems   Anxiety Disorder: anxiety/nervousness, hyperarousal symptoms, fatigue, excessive worry, poor concentration   Panic Disorder: nervous and rapid heart rate   Manic Disorder: denied   Psychotic Disorder: denied   Substance Use:  Marijuana: smoke once per week but makes tired     INTERIM COMMUNICATION:  Zoila Sheldon to [facility] Alyssa Staff (supporting You) 4/4/25  3:43 PM:  Hi Dr. Estes, I wanted to update you with my symptoms. Im not sure if its normal but the vyvanse is making me sleepy. I was never able to take mid day naps before but now I am. I start feeling tired maybe an hour or two after taking it.    Me to  "Zoila Sheldon 4/4/25  5:25 PM:  Has it helped with your focus/attention?    Zoila Sheldon to [facility] Staff (supporting You) 4/4/25  5:31 PM:  not that Ifeanyi noticed    Me to Zoila Sheldon 4/4/25  6:10 PM:  I've sent an order to your pharmacy for an increase to 40 mg daily. Let me know how this works for you.    CURRENT VISIT:  Pt reports increase to Vyvanse 40 mg qd is helping more than 20 mg qd; sleeping better now that got refill of Klonopin. She's noticed decreased appetite--not eating as much "but never really had an appetite." She makes dinner and breakfast and eat some when cooking, not nauseated.     Buspar seems to help with anxiety overall, she feels less stressed, decreased worry; no panic attacks in past 2 months; concern "memory has gone down."    Her mood is stable with Lamictal 100 mg, reports no depression symptoms. Energy level continues to be low--she says this is not a change from baseline. Goes to bed by 10:00 pm, wakes up tired but "always been the case, always tired, can't sleep."    Discussed with pt we need to change to non-stimulant medication for ADHD given her sleep and appetite issues. Discussed Strattera 40 mg qd; reviewed associated benefits and risks.     PLAN:   D/C Vyvanse 40 mg qd;  Start Strattera 40 mg qd;  Continue Lamictal 100 mg qd;  Continue Buspar 10 mg bid;  Continue Klonopin;  RTC in 4 weeks.    Current symptoms:   ADHD: Symptoms managed with Vyvanse   Depressive Disorder: tired/fatigued, decreased appetite, trouble sleeping   Anxiety Disorder: fatigue, trouble sleeping   Panic Disorder: denied   Manic Disorder: denied   Psychotic Disorder: denied   Substance Use:  Marijuana: smoke once per week but makes tired          3/27/2025    11:46 AM   Adult ADHD Self-Report Scale   How often do you have trouble wrapping up the final details of a project once the chanllenging parts have been done? 3   How often do you have " difficulty getting things in order when you have to do a task that requires organization? 4   How often do you have problems remembering appointments or obligations? 4   When you have a task that requires a lot of thought, how often do you avoid or delay getting started? 4   How often do you fidget or squirm with your hands or feet when you have to sit down for a long time? 4   How often do you feel overly active and compelled to do things, like you were driven by a motor? 3   Part A Score 22   How often do you make careless mistakes when you have to work on a boring or difficult project? 4   How often do you have difficulty keeping your attention when you are doing boring or repetitive work? 4   How often do you have difficulty concentrating on what people say to you, even when they are speaking to you directly? 4   How often do you misplace or have difficulty finding things at home or at work? 3   How often are you distracted by activity or noise around you? 4   How often do you leave your seat in meetings or other situations in which you are expected to remain seated? 4   How often do you feel restless or fidgety? 4   How often do you have difficulty unwinding and relaxing when you have time to yourself? 4   How often do you find yourself talking too much when you are in social situations? 4   When you're in a conversation, how often do you find yourself finishing the sentences of the people you are talking to before they can finish them themselves? 4   How often do you have difficulty waiting your turn in situations when turn taking is required? 4   How often do you interrupt others when they are busy? 3   Part B Score 46     Review of Systems   Constitutional:  Positive for appetite change and fatigue. Negative for activity change and unexpected weight change.   Respiratory:  Negative for shortness of breath.    Psychiatric/Behavioral:  Positive for sleep disturbance. Negative for agitation, behavioral  "problems, confusion, decreased concentration, dysphoric mood, hallucinations, self-injury and suicidal ideas. The patient is not nervous/anxious and is not hyperactive.       Constitutional:  Vitals:  Most recent vital signs were reviewed.   Last 3 sets of Vitals        2/18/2025     4:09 PM 2/27/2025     9:50 AM 3/1/2025    12:21 PM   Vitals - 1 value per visit   SYSTOLIC  131 117   DIASTOLIC  73 73   Pulse  91 90   Temp  97 °F (36.1 °C) 99.2 °F (37.3 °C)   Resp  14 16   SPO2  100 % 99 %   Weight (lb) 174  172.8   Weight (kg) 78.926  78.382   Height 5' 5" (1.651 m)     BMI (Calculated) 29            Psychiatric:  Oriented: x 3   Attitude: cooperative   Eye Contact: good   Behavior: wnl   Mood: "okay"  Affect: appropriate range   Attention: intact   Concentration: grossly intact   Thought Process: goal directed   Speech: intelligible  Volume: WNL   Quantity: WNL   Rhythm: WNL  Insight: fair to good   Threats: no SI / HI   Memory: Intact  Psychosis: denies all   Estimate of Intellectual Function: average   Judgment:  good  Relevant Elements of Neurological Exam: normal gait     Allergy Review:   Review of patient's allergies indicates:   Allergen Reactions    Lactase Diarrhea    Milk containing products (dairy) Diarrhea and Nausea And Vomiting      Medical Problem List:   Problem List[1]     Encounter Diagnoses   Name Primary?    Post traumatic stress disorder (PTSD)     Bipolar affective disorder, currently depressed, moderate     Borderline personality disorder     Attention deficit hyperactivity disorder (ADHD) evaluation Yes    Generalized anxiety disorder with panic attacks     Insomnia, unspecified type       PLAN:  Medication Management: Continue current medications. Discussed risks, benefits, and alternatives to treatment plan documented above with patient. I answered all patient questions related to this plan, and patient expressed understanding and agreement.      Follow up in about 4 weeks (around " 6/3/2025) for Medication follow up.     Medication List with Changes/Refills   New Medications    ATOMOXETINE (STRATTERA) 40 MG CAPSULE    Take 1 capsule (40 mg total) by mouth once daily.   Current Medications    FERROUS GLUCONATE (FERGON) 324 MG TABLET    Take 1 tablet (324 mg total) by mouth daily with breakfast.   Changed and/or Refilled Medications    Modified Medication Previous Medication    BUSPIRONE (BUSPAR) 10 MG TABLET busPIRone (BUSPAR) 10 MG tablet       Take 1 tablet (10 mg total) by mouth 2 (two) times daily.    Take 1 tablet (10 mg total) by mouth 2 (two) times daily.    CLONAZEPAM (KLONOPIN) 0.5 MG TABLET clonazePAM (KLONOPIN) 0.5 MG tablet       Take 1 tablet (0.5 mg total) by mouth 2 (two) times daily as needed for Anxiety.    Take 1 tablet (0.5 mg total) by mouth 2 (two) times daily as needed for Anxiety.    LAMOTRIGINE (LAMICTAL) 100 MG TABLET lamoTRIgine (LAMICTAL) 100 MG tablet       Take 1 tablet (100 mg total) by mouth once daily.    Take 1 tablet (100 mg total) by mouth once daily.   Discontinued Medications    LISDEXAMFETAMINE (VYVANSE) 40 MG CAP    Take 1 capsule (40 mg total) by mouth every morning.      Time spent with pt including note preparation: 35 minutes     Alyssa Estes, PhD, MP  Medical Psychologist         [1]   Patient Active Problem List  Diagnosis    Encounter for consultation for female sterilization    Bipolar affective disorder, currently depressed, moderate    Borderline personality disorder    Pituitary tumor    Hypokalemia    Iron deficiency anemia    PTSD (post-traumatic stress disorder)    ALEXIA (generalized anxiety disorder)

## 2025-05-16 ENCOUNTER — TELEPHONE (OUTPATIENT)
Dept: HEMATOLOGY/ONCOLOGY | Facility: CLINIC | Age: 30
End: 2025-05-16
Payer: COMMERCIAL

## 2025-05-16 NOTE — TELEPHONE ENCOUNTER
Lvm for a return call in reference to Hem/Onc appt scheduled on 5/19 has been canceled and will need to be r/s d/t being scheduled incorrectly on Onc schedule. Rufinaner message sent.

## 2025-05-27 ENCOUNTER — TELEPHONE (OUTPATIENT)
Dept: ENDOCRINOLOGY | Facility: CLINIC | Age: 30
End: 2025-05-27
Payer: COMMERCIAL

## 2025-05-27 ENCOUNTER — TELEPHONE (OUTPATIENT)
Dept: NEUROLOGY | Facility: CLINIC | Age: 30
End: 2025-05-27
Payer: COMMERCIAL

## 2025-05-28 ENCOUNTER — OFFICE VISIT (OUTPATIENT)
Dept: OBSTETRICS AND GYNECOLOGY | Facility: CLINIC | Age: 30
End: 2025-05-28
Payer: COMMERCIAL

## 2025-05-28 ENCOUNTER — TELEPHONE (OUTPATIENT)
Dept: OBSTETRICS AND GYNECOLOGY | Facility: CLINIC | Age: 30
End: 2025-05-28
Payer: COMMERCIAL

## 2025-05-28 VITALS
BODY MASS INDEX: 27.07 KG/M2 | SYSTOLIC BLOOD PRESSURE: 122 MMHG | WEIGHT: 162.5 LBS | HEIGHT: 65 IN | DIASTOLIC BLOOD PRESSURE: 68 MMHG

## 2025-05-28 DIAGNOSIS — N92.0 MENORRHAGIA WITH REGULAR CYCLE: Primary | ICD-10-CM

## 2025-05-28 PROCEDURE — 99214 OFFICE O/P EST MOD 30 MIN: CPT | Mod: S$GLB,,, | Performed by: OBSTETRICS & GYNECOLOGY

## 2025-05-28 PROCEDURE — 3078F DIAST BP <80 MM HG: CPT | Mod: CPTII,S$GLB,, | Performed by: OBSTETRICS & GYNECOLOGY

## 2025-05-28 PROCEDURE — 99999 PR PBB SHADOW E&M-EST. PATIENT-LVL III: CPT | Mod: PBBFAC,,, | Performed by: OBSTETRICS & GYNECOLOGY

## 2025-05-28 PROCEDURE — 3044F HG A1C LEVEL LT 7.0%: CPT | Mod: CPTII,S$GLB,, | Performed by: OBSTETRICS & GYNECOLOGY

## 2025-05-28 PROCEDURE — 1159F MED LIST DOCD IN RCRD: CPT | Mod: CPTII,S$GLB,, | Performed by: OBSTETRICS & GYNECOLOGY

## 2025-05-28 PROCEDURE — 3008F BODY MASS INDEX DOCD: CPT | Mod: CPTII,S$GLB,, | Performed by: OBSTETRICS & GYNECOLOGY

## 2025-05-28 PROCEDURE — 3074F SYST BP LT 130 MM HG: CPT | Mod: CPTII,S$GLB,, | Performed by: OBSTETRICS & GYNECOLOGY

## 2025-05-28 NOTE — PROGRESS NOTES
Subjective:       Patient ID: Zoila Sheldon is a 29 y.o. female.    Chief Complaint:  Menorrhagia      History of Present Illness  HPI  Referred for iron deficient anemia and hypermenorrhea   Hct 32 on most recent on oral iron only   Bilateral salpingectomy in early .  Uterine and adnexal anatomy noted to be normal at time of laparoscopy   Patient reports today that she has had very heavy menses for over one year   This is never mentioned during any Gyn visit or prior to the sterilization procedure   Reports that she is fatigued and is very concerned about possible pituitary adenoma   Labs show normal FSH, LH, Cortisol, and prolactin   Recent cranial MRI reports NORMAL cerebral imaging with NORMAL pituitary gland   Patient has report from [facility] 4 years ago with MRI showing 2.5 cm adenoma of the pituitary.  There is no report of surgery or medical treatment   She also reports today that the hospitalization 4 years ago was to treat a Partial Molar pregnancy     Reviewed old records and the [facility] record   She has Neurology appt this week    Discussed management of hypermenorrhea   Recommend hormonal control, Mirena IUD or surgical management with hysteroscopy ablation or hysterectomy     Health Maintenance   Topic Date Due    TETANUS VACCINE  10/18/2017    Pap Smear  Never done    COVID-19 Vaccine ( season) Never done    Influenza Vaccine (Season Ended) 2025    RSV Vaccine (Age 60+ and Pregnant patients) (1 - 1-dose 75+ series) 2071    Hepatitis C Screening  Completed    HIV Screening  Completed    Lipid Panel  Completed    Pneumococcal Vaccines (Age 0-49)  Aged Out     GYN & OB History  Patient's last menstrual period was 2025.   Date of Last Pap: No result found    OB History    Para Term  AB Living   7 4  4 3 4   SAB IAB Ectopic Multiple Live Births   0 2   4      # Outcome Date GA Lbr Arden/2nd Weight Sex Type Anes PTL Lv   7   33w0d    "M  EPI  MARYLIN      Complications: Hypertension   6 Molar  25w0d             Complications: Preeclampsia   5 IAB            4   36w0d   M Vag-Spont None  MARYLIN      Complications: Encounter for blood transfusion   3   35w0d   M Vag-Spont EPI  MARYLIN   2 IAB            1   35w0d   F Vag-Spont None  MARYLIN       Review of Systems  Review of Systems        Objective:   /68   Ht 5' 5" (1.651 m)   Wt 73.7 kg (162 lb 7.7 oz)   LMP 2025   BMI 27.04 kg/m²    Physical Exam     Assessment:        1. Menorrhagia with regular cycle                Plan:            Zoila was seen today for menorrhagia.    Diagnoses and all orders for this visit:    Menorrhagia with regular cycle  -     US Pelvis Complete Non OB; Future    Will follow up with patient after ultrasound to consider Hysteroscopy with ablation or hysterectomy     "

## 2025-06-02 ENCOUNTER — HOSPITAL ENCOUNTER (OUTPATIENT)
Dept: RADIOLOGY | Facility: HOSPITAL | Age: 30
Discharge: HOME | End: 2025-06-02
Attending: OBSTETRICS & GYNECOLOGY
Payer: MEDICAID

## 2025-06-02 DIAGNOSIS — N92.0 MENORRHAGIA WITH REGULAR CYCLE: ICD-10-CM

## 2025-06-02 PROCEDURE — 76856 US EXAM PELVIC COMPLETE: CPT | Mod: 26,,, | Performed by: RADIOLOGY

## 2025-06-02 PROCEDURE — 76856 US EXAM PELVIC COMPLETE: CPT | Mod: TC

## 2025-06-02 PROCEDURE — 76830 TRANSVAGINAL US NON-OB: CPT | Mod: 26,,, | Performed by: RADIOLOGY

## 2025-06-04 ENCOUNTER — RESULTS FOLLOW-UP (OUTPATIENT)
Dept: OBSTETRICS AND GYNECOLOGY | Facility: CLINIC | Age: 30
End: 2025-06-04

## 2025-06-04 DIAGNOSIS — Z01.818 PRE-OP TESTING: Primary | ICD-10-CM

## 2025-06-04 DIAGNOSIS — N92.0 MENORRHAGIA WITH REGULAR CYCLE: ICD-10-CM

## 2025-06-06 ENCOUNTER — OFFICE VISIT (OUTPATIENT)
Dept: HEMATOLOGY/ONCOLOGY | Facility: CLINIC | Age: 30
End: 2025-06-06
Payer: MEDICAID

## 2025-06-06 DIAGNOSIS — D50.9 IRON DEFICIENCY ANEMIA, UNSPECIFIED IRON DEFICIENCY ANEMIA TYPE: ICD-10-CM

## 2025-06-06 DIAGNOSIS — D64.9 NORMOCYTIC ANEMIA: ICD-10-CM

## 2025-06-06 DIAGNOSIS — D75.839 THROMBOCYTOSIS: ICD-10-CM

## 2025-06-06 DIAGNOSIS — R11.0 NAUSEA: Primary | ICD-10-CM

## 2025-06-06 DIAGNOSIS — R63.0 DECREASED APPETITE: ICD-10-CM

## 2025-06-06 RX ORDER — EPINEPHRINE 0.3 MG/.3ML
0.3 INJECTION SUBCUTANEOUS ONCE AS NEEDED
OUTPATIENT
Start: 2025-06-06

## 2025-06-06 RX ORDER — SODIUM CHLORIDE 0.9 % (FLUSH) 0.9 %
10 SYRINGE (ML) INJECTION
OUTPATIENT
Start: 2025-06-06

## 2025-06-06 RX ORDER — SODIUM FERRIC GLUCONATE COMPLEX IN SUCROSE 12.5 MG/ML
125 INJECTION INTRAVENOUS
OUTPATIENT
Start: 2025-06-06

## 2025-06-09 ENCOUNTER — PATIENT MESSAGE (OUTPATIENT)
Dept: INFUSION THERAPY | Facility: HOSPITAL | Age: 30
End: 2025-06-09
Payer: MEDICAID

## 2025-06-09 ENCOUNTER — HOSPITAL ENCOUNTER (OUTPATIENT)
Dept: PREADMISSION TESTING | Facility: HOSPITAL | Age: 30
Discharge: HOME | End: 2025-06-09
Attending: INTERNAL MEDICINE
Payer: MEDICAID

## 2025-06-09 DIAGNOSIS — R11.0 NAUSEA: Primary | ICD-10-CM

## 2025-06-09 DIAGNOSIS — R63.0 DECREASED APPETITE: ICD-10-CM

## 2025-06-16 ENCOUNTER — OFFICE VISIT (OUTPATIENT)
Dept: OBSTETRICS AND GYNECOLOGY | Facility: CLINIC | Age: 30
End: 2025-06-16
Payer: MEDICAID

## 2025-06-16 ENCOUNTER — LAB VISIT (OUTPATIENT)
Dept: LAB | Facility: HOSPITAL | Age: 30
End: 2025-06-16
Attending: OBSTETRICS & GYNECOLOGY
Payer: MEDICAID

## 2025-06-16 VITALS
SYSTOLIC BLOOD PRESSURE: 130 MMHG | BODY MASS INDEX: 27.7 KG/M2 | WEIGHT: 166.25 LBS | HEIGHT: 65 IN | DIASTOLIC BLOOD PRESSURE: 82 MMHG

## 2025-06-16 DIAGNOSIS — Z01.818 PRE-OP TESTING: ICD-10-CM

## 2025-06-16 DIAGNOSIS — N92.0 MENORRHAGIA WITH REGULAR CYCLE: Primary | ICD-10-CM

## 2025-06-16 DIAGNOSIS — N92.0 MENORRHAGIA WITH REGULAR CYCLE: ICD-10-CM

## 2025-06-16 LAB
ABSOLUTE EOSINOPHIL (OHS): 0.06 K/UL
ABSOLUTE MONOCYTE (OHS): 0.67 K/UL (ref 0.3–1)
ABSOLUTE NEUTROPHIL COUNT (OHS): 5.29 K/UL (ref 1.8–7.7)
ALBUMIN SERPL BCP-MCNC: 4.5 G/DL (ref 3.5–5.2)
ALP SERPL-CCNC: 73 UNIT/L (ref 40–150)
ALT SERPL W/O P-5'-P-CCNC: 12 UNIT/L (ref 10–44)
ANION GAP (OHS): 7 MMOL/L (ref 8–16)
AST SERPL-CCNC: 19 UNIT/L (ref 11–45)
BASOPHILS # BLD AUTO: 0.08 K/UL
BASOPHILS NFR BLD AUTO: 0.9 %
BILIRUB SERPL-MCNC: 0.1 MG/DL (ref 0.1–1)
BUN SERPL-MCNC: 9 MG/DL (ref 6–20)
CALCIUM SERPL-MCNC: 9.2 MG/DL (ref 8.7–10.5)
CHLORIDE SERPL-SCNC: 106 MMOL/L (ref 95–110)
CO2 SERPL-SCNC: 23 MMOL/L (ref 23–29)
CREAT SERPL-MCNC: 0.8 MG/DL (ref 0.5–1.4)
ERYTHROCYTE [DISTWIDTH] IN BLOOD BY AUTOMATED COUNT: 18.1 % (ref 11.5–14.5)
GFR SERPLBLD CREATININE-BSD FMLA CKD-EPI: >60 ML/MIN/1.73/M2
GLUCOSE SERPL-MCNC: 79 MG/DL (ref 70–110)
HCT VFR BLD AUTO: 32.3 % (ref 37–48.5)
HGB BLD-MCNC: 9.8 GM/DL (ref 12–16)
IMM GRANULOCYTES # BLD AUTO: 0.02 K/UL (ref 0–0.04)
IMM GRANULOCYTES NFR BLD AUTO: 0.2 % (ref 0–0.5)
LYMPHOCYTES # BLD AUTO: 2.35 K/UL (ref 1–4.8)
MCH RBC QN AUTO: 24.6 PG (ref 27–31)
MCHC RBC AUTO-ENTMCNC: 30.3 G/DL (ref 32–36)
MCV RBC AUTO: 81 FL (ref 82–98)
NUCLEATED RBC (/100WBC) (OHS): 0 /100 WBC
PLATELET # BLD AUTO: 427 K/UL (ref 150–450)
PMV BLD AUTO: 10.3 FL (ref 9.2–12.9)
POTASSIUM SERPL-SCNC: 4 MMOL/L (ref 3.5–5.1)
PROT SERPL-MCNC: 7.4 GM/DL (ref 6–8.4)
RBC # BLD AUTO: 3.99 M/UL (ref 4–5.4)
RELATIVE EOSINOPHIL (OHS): 0.7 %
RELATIVE LYMPHOCYTE (OHS): 27.7 % (ref 18–48)
RELATIVE MONOCYTE (OHS): 7.9 % (ref 4–15)
RELATIVE NEUTROPHIL (OHS): 62.6 % (ref 38–73)
SODIUM SERPL-SCNC: 136 MMOL/L (ref 136–145)
WBC # BLD AUTO: 8.47 K/UL (ref 3.9–12.7)

## 2025-06-16 PROCEDURE — 99999 PR PBB SHADOW E&M-EST. PATIENT-LVL III: CPT | Mod: PBBFAC,,, | Performed by: OBSTETRICS & GYNECOLOGY

## 2025-06-16 PROCEDURE — 36415 COLL VENOUS BLD VENIPUNCTURE: CPT

## 2025-06-16 PROCEDURE — 85025 COMPLETE CBC W/AUTO DIFF WBC: CPT

## 2025-06-16 PROCEDURE — 99213 OFFICE O/P EST LOW 20 MIN: CPT | Mod: PBBFAC | Performed by: OBSTETRICS & GYNECOLOGY

## 2025-06-16 RX ORDER — FAMOTIDINE 20 MG/1
20 TABLET, FILM COATED ORAL
OUTPATIENT
Start: 2025-06-16

## 2025-06-16 RX ORDER — LIDOCAINE HYDROCHLORIDE 10 MG/ML
1 INJECTION, SOLUTION EPIDURAL; INFILTRATION; INTRACAUDAL; PERINEURAL ONCE
OUTPATIENT
Start: 2025-06-16 | End: 2025-06-16

## 2025-06-16 NOTE — H&P
Subjective     Patient ID: Zoila Sheldon is a 29 y.o. female.    Chief Complaint:  Pre-op Exam      History of Present Illness  HPI  Heavy menses with anemia   Bilateral tubal ligation in the recent past   Normal ultrasound of the uterus   Desires surgical intervention with hysteroscopic ablation   Is aware of the 50 % return of bleeding rate and 10 to 15 % return of abnormal bleeding over the lifespan of her ovaries       GYN & OB History  Patient's last menstrual period was 2025 (exact date).   Date of Last Pap: No result found    OB History    Para Term  AB Living   7 4  4 3 4   SAB IAB Ectopic Multiple Live Births   0 2   4      # Outcome Date GA Lbr Arden/2nd Weight Sex Type Anes PTL Lv   7   33w0d   M  EPI  MARYLIN      Complications: Hypertension   6 Molar  25w0d             Complications: Preeclampsia   5 IAB            4   36w0d   M Vag-Spont None  MARYLIN      Complications: Encounter for blood transfusion   3   35w0d   M Vag-Spont EPI  [facility]LIN   2 IAB            1   35w0d   F Vag-Spont None  MARYLIN     Past Medical History:   Diagnosis Date    Anemia, unspecified     Anxiety disorder, unspecified     Bipolar disorder, unspecified     Borderline personality disorder     Depression     Pituitary tumor      Past Surgical History:   Procedure Laterality Date    INDUCED       Surgical    LAPAROSCOPIC SALPINGECTOMY Bilateral 2025    Procedure: SALPINGECTOMY, LAPAROSCOPIC;  Surgeon: DEBBIE Will MD;  Location: [facility];  Service: OB/GYN;  Laterality: Bilateral;    [place] teeth surgery       Family History   Problem Relation Name Age of Onset    Depression Mother      Anxiety Mother      ADD / ADHD Mother      Bipolar disorder Father      ADD / ADHD Sister      Prostate cancer Maternal Grandfather      Colon cancer Paternal Grandfather       Social History[1]  (Not in a hospital admission)    Review of patient's allergies indicates:   Allergen  Reactions    Lactase Diarrhea    Milk containing products (dairy) Diarrhea and Nausea And Vomiting     Current Outpatient Medications   Medication Sig    atomoxetine (STRATTERA) 40 MG capsule Take 1 capsule (40 mg total) by mouth once daily.    busPIRone (BUSPAR) 10 MG tablet Take 1 tablet (10 mg total) by mouth 2 (two) times daily.    clonazePAM (KLONOPIN) 0.5 MG tablet Take 1 tablet (0.5 mg total) by mouth 2 (two) times daily as needed for Anxiety.    ferrous gluconate (FERGON) 324 MG tablet Take 1 tablet (324 mg total) by mouth daily with breakfast.    lamoTRIgine (LAMICTAL) 100 MG tablet Take 1 tablet (100 mg total) by mouth once daily.     No current facility-administered medications for this visit.         Review of Systems  Review of Systems   Constitutional:  Negative for appetite change, chills, fatigue, fever and unexpected weight change.   HENT: Negative.     Eyes:  Negative for visual disturbance.   Respiratory:  Negative for shortness of breath and wheezing.    Cardiovascular:  Negative for chest pain, palpitations and leg swelling.   Gastrointestinal:  Negative for abdominal pain, bloating, blood in stool, constipation, diarrhea, nausea and vomiting.   Endocrine: Negative for hair loss, hot flashes and hypothyroidism.   Genitourinary:  Positive for menorrhagia and menstrual problem. Negative for dysmenorrhea, dyspareunia, dysuria, flank pain, frequency, genital sores, hematuria, pelvic pain, urgency, vaginal bleeding, vaginal discharge, urinary incontinence and vaginal odor.   Musculoskeletal:  Negative for back pain, joint swelling and myalgias.   Integumentary:  Negative for rash, hair changes, breast mass, nipple discharge and breast skin changes.   Neurological:  Negative for syncope and headaches.   Hematological:  Negative for adenopathy. Does not bruise/bleed easily.   Psychiatric/Behavioral:  Negative for depression and sleep disturbance. The patient is not nervous/anxious.    Breast: Negative  for mass, mastodynia, nipple discharge and skin changes         Objective   Physical Exam:   Constitutional: She is oriented to person, place, and time. Vital signs are normal. She appears well-developed and well-nourished. No distress.    HENT:   Head: Normocephalic and atraumatic.   Right Ear: External ear normal.   Left Ear: External ear normal.   Nose: Nose normal.    Eyes: Pupils are equal, round, and reactive to light. Conjunctivae are normal.    Neck: Trachea normal. No JVD present. No thyroid mass and no thyromegaly present.    Cardiovascular:  Normal rate and regular rhythm.             Pulmonary/Chest: Effort normal and breath sounds normal. No respiratory distress.        Abdominal: Soft. Bowel sounds are normal. She exhibits no distension and no mass. There is no abdominal tenderness. No hernia. Hernia confirmed negative in the ventral area, confirmed negative in the right inguinal area and confirmed negative in the left inguinal area.     Genitourinary:    Vagina, uterus and rectum normal.   Rectum:      No external hemorrhoid or abnormal anal tone.     Labial bartholins normal.There is no rash, tenderness or lesion on the right labia. There is no rash, tenderness or lesion on the left labia. Cervix is normal. Right adnexum displays no mass, no tenderness and no fullness. Left adnexum displays no mass, no tenderness and no fullness. No vaginal discharge, tenderness, bleeding, rectocele, cystocele or prolapse of vaginal walls in the vagina.    No foreign body in the vagina.   Cervix exhibits no motion tenderness, no discharge and no friability. Uterus is not deviated, not enlarged and not tender.           Musculoskeletal: Normal range of motion.       Neurological: She is alert and oriented to person, place, and time. She has normal reflexes.    Skin: Skin is warm and dry. She is not diaphoretic.    Psychiatric: She has a normal mood and affect. Her speech is normal and behavior is normal. Thought  content normal.            Assessment and Plan     1. Menorrhagia with regular cycle    2. Pre-op testing             Plan:  Zoila was seen today for pre-op exam.    Diagnoses and all orders for this visit:    Menorrhagia with regular cycle  Comments:  Hysteroscopy wth Myosure Ablation    Pre-op testing  -     EKG 12-lead; Future                    [1]   Social History  Tobacco Use    Smoking status: Never    Smokeless tobacco: Never   Substance Use Topics    Alcohol use: Not Currently    Drug use: Not Currently     Types: Marijuana     Comment: smokes: 1gm/wk

## 2025-06-16 NOTE — PROGRESS NOTES
"  Subjective:       Patient ID: Zoila Sheldon is a 29 y.o. female.    Chief Complaint:  Pre-op Exam      History of Present Illness  HPI  I have explained the risks, benefits , and alternatives of hysteroscopy with ablation to include the failure rate in detail.  The patient voices understanding and all questions have been answered.  The patient agrees to proceed as planned.  Consent forms for the procedure have been reviewed and signed by the patient.     Health Maintenance   Topic Date Due    TETANUS VACCINE  10/18/2017    Pap Smear  Never done    COVID-19 Vaccine ( season) Never done    Influenza Vaccine (Season Ended) 2025    RSV Vaccine (Age 60+ and Pregnant patients) (1 - 1-dose 75+ series) 2071    Hepatitis C Screening  Completed    HIV Screening  Completed    Lipid Panel  Completed    Pneumococcal Vaccines (Age 0-49)  Aged Out     GYN & OB History  Patient's last menstrual period was 2025 (exact date).   Date of Last Pap: No result found    OB History    Para Term  AB Living   7 4  4 3 4   SAB IAB Ectopic Multiple Live Births   0 2   4      # Outcome Date GA Lbr Arden/2nd Weight Sex Type Anes PTL Lv   7   33w0d   M  EPI  [facility]LIN      Complications: Hypertension   6 Molar  25w0d             Complications: Preeclampsia   5 IAB            4   36w0d   M Vag-Spont None  [facility]LIN      Complications: Encounter for blood transfusion   3   35w0d   M Vag-Spont EPI  MARYLIN   2 IAB            1   35w0d   F Vag-Spont None  MARYLIN       Review of Systems  Review of Systems        Objective:   /82 (BP Location: Right arm, Patient Position: Sitting)   Ht 5' 5" (1.651 m)   Wt 75.4 kg (166 lb 3.6 oz)   LMP 2025 (Exact Date)   BMI 27.66 kg/m²    Physical Exam     Assessment:        1. Menorrhagia with regular cycle    2. Pre-op testing                Plan:            Zoila was seen today for pre-op exam.    Diagnoses and all orders for this " visit:    Menorrhagia with regular cycle  Comments:  Hysteroscopy wth Myosure Ablation    Pre-op testing  -     EKG 12-lead; Future

## 2025-06-17 ENCOUNTER — PATIENT MESSAGE (OUTPATIENT)
Dept: INTERNAL MEDICINE | Facility: CLINIC | Age: 30
End: 2025-06-17
Payer: MEDICAID

## 2025-06-19 ENCOUNTER — HOSPITAL ENCOUNTER (EMERGENCY)
Facility: HOSPITAL | Age: 30
Discharge: HOME | End: 2025-06-19
Attending: EMERGENCY MEDICINE
Payer: MEDICAID

## 2025-06-19 ENCOUNTER — OCHSNER VIRTUAL EMERGENCY DEPARTMENT (OUTPATIENT)
Facility: CLINIC | Age: 30
End: 2025-06-19
Payer: MEDICAID

## 2025-06-19 ENCOUNTER — NURSE TRIAGE (OUTPATIENT)
Dept: ADMINISTRATIVE | Facility: CLINIC | Age: 30
End: 2025-06-19
Payer: MEDICAID

## 2025-06-19 ENCOUNTER — PATIENT MESSAGE (OUTPATIENT)
Dept: HEMATOLOGY/ONCOLOGY | Facility: CLINIC | Age: 30
End: 2025-06-19
Payer: MEDICAID

## 2025-06-19 ENCOUNTER — PATIENT OUTREACH (OUTPATIENT)
Facility: OTHER | Age: 30
End: 2025-06-19
Payer: MEDICAID

## 2025-06-19 VITALS
HEIGHT: 65 IN | HEART RATE: 98 BPM | DIASTOLIC BLOOD PRESSURE: 70 MMHG | OXYGEN SATURATION: 96 % | RESPIRATION RATE: 18 BRPM | TEMPERATURE: 98 F | BODY MASS INDEX: 27.39 KG/M2 | SYSTOLIC BLOOD PRESSURE: 112 MMHG | WEIGHT: 164.38 LBS

## 2025-06-19 DIAGNOSIS — R06.02 SOB (SHORTNESS OF BREATH): ICD-10-CM

## 2025-06-19 DIAGNOSIS — R00.0 SINUS TACHYCARDIA: Primary | ICD-10-CM

## 2025-06-19 DIAGNOSIS — E87.6 HYPOKALEMIA: ICD-10-CM

## 2025-06-19 DIAGNOSIS — R00.0 TACHYCARDIA: ICD-10-CM

## 2025-06-19 LAB
ABSOLUTE EOSINOPHIL (OHS): 0.04 K/UL
ABSOLUTE MONOCYTE (OHS): 0.4 K/UL (ref 0.3–1)
ABSOLUTE NEUTROPHIL COUNT (OHS): 5.31 K/UL (ref 1.8–7.7)
ALBUMIN SERPL BCP-MCNC: 4.4 G/DL (ref 3.5–5.2)
ALP SERPL-CCNC: 68 UNIT/L (ref 40–150)
ALT SERPL W/O P-5'-P-CCNC: 14 UNIT/L (ref 10–44)
ANION GAP (OHS): 11 MMOL/L (ref 8–16)
AST SERPL-CCNC: 19 UNIT/L (ref 11–45)
BASOPHILS # BLD AUTO: 0.06 K/UL
BASOPHILS NFR BLD AUTO: 0.8 %
BILIRUB SERPL-MCNC: 0.4 MG/DL (ref 0.1–1)
BNP SERPL-MCNC: <10 PG/ML (ref 0–99)
BUN SERPL-MCNC: 7 MG/DL (ref 6–20)
CALCIUM SERPL-MCNC: 9.7 MG/DL (ref 8.7–10.5)
CHLORIDE SERPL-SCNC: 106 MMOL/L (ref 95–110)
CO2 SERPL-SCNC: 21 MMOL/L (ref 23–29)
CREAT SERPL-MCNC: 0.8 MG/DL (ref 0.5–1.4)
D DIMER PPP IA.FEU-MCNC: 0.29 MG/L FEU
ERYTHROCYTE [DISTWIDTH] IN BLOOD BY AUTOMATED COUNT: 18 % (ref 11.5–14.5)
GFR SERPLBLD CREATININE-BSD FMLA CKD-EPI: >60 ML/MIN/1.73/M2
GLUCOSE SERPL-MCNC: 89 MG/DL (ref 70–110)
HCT VFR BLD AUTO: 32.9 % (ref 37–48.5)
HCV AB SERPL QL IA: NEGATIVE
HGB BLD-MCNC: 10.2 GM/DL (ref 12–16)
HIV 1+2 AB+HIV1 P24 AG SERPL QL IA: NEGATIVE
HOLD SPECIMEN: NORMAL
IMM GRANULOCYTES # BLD AUTO: 0.02 K/UL (ref 0–0.04)
IMM GRANULOCYTES NFR BLD AUTO: 0.3 % (ref 0–0.5)
INDIRECT COOMBS: NORMAL
LYMPHOCYTES # BLD AUTO: 1.71 K/UL (ref 1–4.8)
MCH RBC QN AUTO: 24.5 PG (ref 27–31)
MCHC RBC AUTO-ENTMCNC: 31 G/DL (ref 32–36)
MCV RBC AUTO: 79 FL (ref 82–98)
NUCLEATED RBC (/100WBC) (OHS): 0 /100 WBC
OHS QRS DURATION: 74 MS
OHS QTC CALCULATION: 457 MS
PLATELET # BLD AUTO: 447 K/UL (ref 150–450)
PMV BLD AUTO: 9.1 FL (ref 9.2–12.9)
POTASSIUM SERPL-SCNC: 3.4 MMOL/L (ref 3.5–5.1)
PROT SERPL-MCNC: 8.4 GM/DL (ref 6–8.4)
RBC # BLD AUTO: 4.16 M/UL (ref 4–5.4)
RELATIVE EOSINOPHIL (OHS): 0.5 %
RELATIVE LYMPHOCYTE (OHS): 22.7 % (ref 18–48)
RELATIVE MONOCYTE (OHS): 5.3 % (ref 4–15)
RELATIVE NEUTROPHIL (OHS): 70.4 % (ref 38–73)
RH BLD: NORMAL
SODIUM SERPL-SCNC: 138 MMOL/L (ref 136–145)
SPECIMEN OUTDATE: NORMAL
TROPONIN I SERPL DL<=0.01 NG/ML-MCNC: <0.006 NG/ML
TSH SERPL-ACNC: 0.54 UIU/ML (ref 0.4–4)
WBC # BLD AUTO: 7.54 K/UL (ref 3.9–12.7)

## 2025-06-19 PROCEDURE — 25000003 PHARM REV CODE 250: Performed by: EMERGENCY MEDICINE

## 2025-06-19 PROCEDURE — 93010 ELECTROCARDIOGRAM REPORT: CPT | Mod: ,,, | Performed by: INTERNAL MEDICINE

## 2025-06-19 PROCEDURE — 96360 HYDRATION IV INFUSION INIT: CPT

## 2025-06-19 PROCEDURE — 83880 ASSAY OF NATRIURETIC PEPTIDE: CPT | Performed by: NURSE PRACTITIONER

## 2025-06-19 PROCEDURE — 85379 FIBRIN DEGRADATION QUANT: CPT | Performed by: EMERGENCY MEDICINE

## 2025-06-19 PROCEDURE — 84484 ASSAY OF TROPONIN QUANT: CPT | Performed by: NURSE PRACTITIONER

## 2025-06-19 PROCEDURE — 86850 RBC ANTIBODY SCREEN: CPT | Performed by: NURSE PRACTITIONER

## 2025-06-19 PROCEDURE — 99285 EMERGENCY DEPT VISIT HI MDM: CPT | Mod: 25

## 2025-06-19 PROCEDURE — 93005 ELECTROCARDIOGRAM TRACING: CPT

## 2025-06-19 PROCEDURE — 86803 HEPATITIS C AB TEST: CPT | Performed by: EMERGENCY MEDICINE

## 2025-06-19 PROCEDURE — 85025 COMPLETE CBC W/AUTO DIFF WBC: CPT | Performed by: NURSE PRACTITIONER

## 2025-06-19 PROCEDURE — 84295 ASSAY OF SERUM SODIUM: CPT | Performed by: NURSE PRACTITIONER

## 2025-06-19 PROCEDURE — 87389 HIV-1 AG W/HIV-1&-2 AB AG IA: CPT | Performed by: EMERGENCY MEDICINE

## 2025-06-19 PROCEDURE — 84443 ASSAY THYROID STIM HORMONE: CPT | Performed by: NURSE PRACTITIONER

## 2025-06-19 PROCEDURE — 96361 HYDRATE IV INFUSION ADD-ON: CPT

## 2025-06-19 RX ORDER — POTASSIUM CHLORIDE 20 MEQ/1
20 TABLET, EXTENDED RELEASE ORAL 2 TIMES DAILY
Qty: 15 TABLET | Refills: 0 | Status: SHIPPED | OUTPATIENT
Start: 2025-06-19

## 2025-06-19 RX ORDER — POTASSIUM CHLORIDE 20 MEQ/1
40 TABLET, EXTENDED RELEASE ORAL
Status: COMPLETED | OUTPATIENT
Start: 2025-06-19 | End: 2025-06-19

## 2025-06-19 RX ADMIN — SODIUM CHLORIDE 1000 ML: 0.9 INJECTION, SOLUTION INTRAVENOUS at 04:06

## 2025-06-19 RX ADMIN — POTASSIUM CHLORIDE 40 MEQ: 1500 TABLET, EXTENDED RELEASE ORAL at 04:06

## 2025-06-19 NOTE — PLAN OF CARE-OVED
[facility] Plan of Care Note  Referral Source: Nurse On-Call                               Chief Complaint   Patient presents with    Palpitations     Pt with hx anemia. concerned today because noticed her resting HR was 117 sat 99. No CP or difficulty breathing. Has some lightheadedness with prolonged standing. While on phone pt got up to recheck HR- HR increased to 130 while on phone- pt states feels some palpitations     I reviewed studies.  Tried to offer reassurance regarding studies.  Unable to find care with PCP or urgent care    Recommendation: Emergency Department

## 2025-06-19 NOTE — FIRST PROVIDER EVALUATION
"Medical screening examination initiated.  I have conducted a focused provider triage encounter, findings are as follows:    Brief history of present illness:  sob and palpitations. Hx of anemia     Vitals:    06/19/25 1330   BP: 129/76   BP Location: Right arm   Pulse: (!) 121   Resp: 18   Temp: 98.3 °F (36.8 °C)   TempSrc: Oral   SpO2: 100%   Weight: 74.6 kg (164 lb 6.4 oz)   Height: 5' 5" (1.651 m)       Pertinent physical exam:  tachy     Brief workup plan:  labs, EKG, imaging, further eval     Preliminary workup initiated; this workup will be continued and followed by the physician or advanced practice provider that is assigned to the patient when roomed.  "

## 2025-06-19 NOTE — TELEPHONE ENCOUNTER
Pt called in stating she wants to speak with provider. States she feels was concerned that her   States  while laying down 99 %.  Felt shaky.   Intermittent lightheadedness and dizziness x 4 days.   Recheck while on phone  97% after pt had gotten up. No chest pain at present. Brief- lasting seconds occasional CP recently. Adds she has dizziness and difficulty breathing with activity. Intermittent palpitations but states she has them from time to time. Care advice per protocol. Consulted with [facility] per protocol. Advised by Dr. Avendaño to see if PCP appt available today- no appt available today. Pt advised UC- pt states she usually goes to ED because insurance issues. Pt advised by Dr. Avendaño to go to ED for eval. Pt states she will get a ride and go to ED for eval. Advised to monitor and to call back with concerns or worsening symptoms. Verbalized understanding.      Reason for Disposition   Extra heartbeats, irregular heart beating, or heart is beating very fast (i.e., 'palpitations')    Additional Information   Negative: SEVERE difficulty breathing (e.g., struggling for each breath, speaks in single words)   Negative: Shock suspected (e.g., cold/pale/clammy skin, too weak to stand, low BP, rapid pulse)   Negative: Difficult to awaken or acting confused (e.g., disoriented, slurred speech)   Negative: Fainted, and still feels dizzy afterwards   Negative: Overdose (accidental or intentional) of medications   Negative: New neurologic deficit that is present now: * Weakness of the face, arm, or leg on one side of the body * Numbness of the face, arm, or leg on one side of the body * Loss of speech or garbled speech   Negative: Heart beating < 50 beats per minute OR > 140 beats per minute   Negative: Sounds like a life-threatening emergency to the triager   Negative: SEVERE dizziness (e.g., unable to stand, requires support to walk, feels like passing out now)   Negative: SEVERE headache or neck pain    Negative: Spinning or tilting sensation (vertigo) present now and one or more stroke risk factors (i.e., hypertension, diabetes mellitus, prior stroke/TIA, heart attack, age over 60) (Exception: Prior physician evaluation for this AND no different/worse than usual.)   Negative: Neurologic deficit that was brief (now gone), ANY of the following:* Weakness of the face, arm, or leg on one side of the body* Numbness of the face, arm, or leg on one side of the body* Loss of speech or garbled speech   Negative: Loss of vision or double vision  (Exception: Similar to previous migraines.)    Protocols used: Dizziness-A-OH

## 2025-06-19 NOTE — PROGRESS NOTES
"Patient spoke with [facility]OC RN on 6/19/2025 with complaint of "Pt with hx anemia. concerned today because noticed her resting HR was 117 sat 99. No CP or difficulty breathing. Has some lightheadedness with prolonged standing. While on phone pt got up to recheck HR- HR increased to 130 while on phone- pt states feels some palpitations but has had them in the past as well."    [facility]OC RN consulted with [facility] provider, Dr. Avendaño, and disposition recommended was emergency department.  Will follow up with patient during the week of June 20-28, 2025 to assess if patient received the care she was needing.        "

## 2025-06-19 NOTE — ED PROVIDER NOTES
SCRIBE #1 NOTE: I, Kitty Horton, am scribing for, and in the presence of, Manoj Oliva MD. I have scribed the entire note.       History     Chief Complaint   Patient presents with    Shortness of Breath     SOB w/ exertion, tachycardia. Hx anemia.      Review of patient's allergies indicates:   Allergen Reactions    Lactase Diarrhea    Milk containing products (dairy) Diarrhea and Nausea And Vomiting         History of Present Illness     HPI    2025, 4:29 PM  History obtained from the patient and medical records      History of Present Illness: Zoila Sheldon is a 29 y.o. female patient with a PMHx of pituitary tumor, anxiety disorder, depression, bipolar disorder, anemia, and borderline personality disorder who presents to the Emergency Department for evaluation of SOB on exertion today.  No mitigating or exacerbating factors reported. Associated sxs include tachycardia (-150 on exertion, 100 at rest). Symptoms are constant and moderate in severity. Patient denies any lightheadedness, CP, n/v, or diaphoresis at this time. No prior Tx specified. Pt is scheduled to have an iron infusion done soon. No further complaints or concerns at this time.       Arrival mode: Personal Transportation    PCP: Kimberly Yañez MD        Past Medical History:  Past Medical History:   Diagnosis Date    Anemia, unspecified     Anxiety disorder, unspecified     Bipolar disorder, unspecified     Borderline personality disorder     Depression     Pituitary tumor        Past Surgical History:  Past Surgical History:   Procedure Laterality Date    INDUCED       Surgical    LAPAROSCOPIC SALPINGECTOMY Bilateral 2025    Procedure: SALPINGECTOMY, LAPAROSCOPIC;  Surgeon: DEBBIE Will MD;  Location: [facility];  Service: OB/GYN;  Laterality: Bilateral;    [place] teeth surgery           Family History:  Family History   Problem Relation Name Age of Onset    Depression Mother      Anxiety  Mother      ADD / ADHD Mother      Bipolar disorder Father      ADD / ADHD Sister      Prostate cancer Maternal Grandfather      Celiac disease Maternal Grandfather      Colon cancer Paternal Grandfather         Social History:  Social History     Tobacco Use    Smoking status: Never    Smokeless tobacco: Never   Substance and Sexual Activity    Alcohol use: Not Currently    Drug use: Not Currently     Types: Marijuana     Comment: smokes: 1gm/wk    Sexual activity: Not Currently     Partners: Male        Review of Systems     Review of Systems   Constitutional:  Negative for diaphoresis and fever.   HENT:  Negative for sore throat.    Respiratory:  Positive for shortness of breath (on exertion).    Cardiovascular:  Positive for palpitations (tachycardiac; 100 at rest, 140-150 on exertion). Negative for chest pain.   Gastrointestinal:  Negative for nausea and vomiting.   Genitourinary:  Negative for dysuria.   Musculoskeletal:  Negative for back pain.   Skin:  Negative for rash.   Neurological:  Negative for weakness and light-headedness.   Hematological:  Does not bruise/bleed easily.   All other systems reviewed and are negative.     Physical Exam     Initial Vitals [06/19/25 1330]   BP Pulse Resp Temp SpO2   129/76 (!) 121 18 98.3 °F (36.8 °C) 100 %      MAP       --          Physical Exam  Nursing Notes and Vital Signs Reviewed.  Constitutional: Patient is in no acute distress. Well-developed and well-nourished.  Head: Atraumatic. Normocephalic.  Eyes: EOM intact.   ENT: Mucous membranes are moist. Oropharynx is clear and symmetric.    Neck: Supple. Full ROM. No lymphadenopathy.  Cardiovascular: Tachycardiac. Regular rhythm. No murmurs, rubs, or gallops.   Pulmonary/Chest: No respiratory distress. Clear to auscultation bilaterally. No wheezing or rales.   Abdominal: Soft and non-distended. There is no tenderness.  No rebound, guarding, or rigidity.   Musculoskeletal: Moves all extremities. No obvious  "deformities. No edema. No calf tenderness.   Skin: Warm and dry.  Neurological:  Alert, awake, and appropriate.  Normal speech.  No acute focal neurological deficits are appreciated.  Psychiatric: Normal affect. Good eye contact. Appropriate in content.      ED Course   Procedures  ED Vital Signs:  Vitals:    06/19/25 1330 06/19/25 1615 06/19/25 1626 06/19/25 1651   BP: 129/76   117/73   Pulse: (!) 121 98 95 94   Resp: 18   13   Temp: 98.3 °F (36.8 °C)      TempSrc: Oral      SpO2: 100% 95% 100% 99%   Weight: 74.6 kg (164 lb 6.4 oz)      Height: 5' 5" (1.651 m)       06/19/25 1700 06/19/25 1730 06/19/25 1800 06/19/25 1900   BP: 117/71 116/80 121/79 122/79   Pulse: 92 92 88 87   Resp: 20 16 17 18   Temp:       TempSrc:       SpO2: 96% 98% 100% 98%   Weight:       Height:           Abnormal Lab Results:  Labs Reviewed   COMPREHENSIVE METABOLIC PANEL - Abnormal       Result Value    Sodium 138      Potassium 3.4 (*)     Chloride 106      CO2 21 (*)     Glucose 89      BUN 7      Creatinine 0.8      Calcium 9.7      Protein Total 8.4      Albumin 4.4      Bilirubin Total 0.4      ALP 68      AST 19      ALT 14      Anion Gap 11      eGFR >60     CBC WITH DIFFERENTIAL - Abnormal    WBC 7.54      RBC 4.16      HGB 10.2 (*)     HCT 32.9 (*)     MCV 79 (*)     MCH 24.5 (*)     MCHC 31.0 (*)     RDW 18.0 (*)     Platelet Count 447      MPV 9.1 (*)     Nucleated RBC 0      Neut % 70.4      Lymph % 22.7      Mono % 5.3      Eos % 0.5      Basophil % 0.8      Imm Grans % 0.3      Neut # 5.31      Lymph # 1.71      Mono # 0.40      Eos # 0.04      Baso # 0.06      Imm Grans # 0.02     HEPATITIS C ANTIBODY - Normal    Hep C Ab Interp Negative     HIV 1 / 2 ANTIBODY - Normal    HIV 1/2 Ag/Ab Negative     TROPONIN I - Normal    Troponin-I <0.006     B-TYPE NATRIURETIC PEPTIDE - Normal    BNP <10     TSH - Normal    TSH 0.541     D DIMER, QUANTITATIVE - Normal    D-Dimer 0.29     HEP C VIRUS HOLD SPECIMEN    Extra Tube Hold for " add-ons.     CBC W/ AUTO DIFFERENTIAL    Narrative:     The following orders were created for panel order CBC Auto Differential.  Procedure                               Abnormality         Status                     ---------                               -----------         ------                     CBC with Differential[[id number]]       Abnormal            Final result                 Please view results for these tests on the individual orders.   TYPE & SCREEN    Specimen Outdate 06/22/2025 23:59      Group & Rh B POS      Indirect Felipe NEG          All Lab Results:  Results for orders placed or performed during the hospital encounter of 06/19/25   EKG 12-lead    Collection Time: 06/19/25  1:32 PM   Result Value Ref Range    QRS Duration 74 ms    [facility] QTC Calculation 457 ms   Hepatitis C Antibody    Collection Time: 06/19/25  3:50 PM   Result Value Ref Range    Hep C Ab Interp Negative Negative   HCV Virus Hold Specimen    Collection Time: 06/19/25  3:50 PM   Result Value Ref Range    Extra Tube Hold for add-ons.    HIV 1/2 Ag/Ab (4th Gen)    Collection Time: 06/19/25  3:50 PM   Result Value Ref Range    HIV 1/2 Ag/Ab Negative Negative   Comprehensive Metabolic Panel    Collection Time: 06/19/25  3:50 PM   Result Value Ref Range    Sodium 138 136 - 145 mmol/L    Potassium 3.4 (L) 3.5 - 5.1 mmol/L    Chloride 106 95 - 110 mmol/L    CO2 21 (L) 23 - 29 mmol/L    Glucose 89 70 - 110 mg/dL    BUN 7 6 - 20 mg/dL    Creatinine 0.8 0.5 - 1.4 mg/dL    Calcium 9.7 8.7 - 10.5 mg/dL    Protein Total 8.4 6.0 - 8.4 gm/dL    Albumin 4.4 3.5 - 5.2 g/dL    Bilirubin Total 0.4 0.1 - 1.0 mg/dL    ALP 68 40 - 150 unit/L    AST 19 11 - 45 unit/L    ALT 14 10 - 44 unit/L    Anion Gap 11 8 - 16 mmol/L    eGFR >60 >60 mL/min/1.73/m2   Troponin I    Collection Time: 06/19/25  3:50 PM   Result Value Ref Range    Troponin-I <0.006 <=0.026 ng/mL   Brain natriuretic peptide    Collection Time: 06/19/25  3:50 PM   Result Value Ref Range     BNP <10 0 - 99 pg/mL   TSH    Collection Time: 06/19/25  3:50 PM   Result Value Ref Range    TSH 0.541 0.400 - 4.000 uIU/mL   CBC with Differential    Collection Time: 06/19/25  3:50 PM   Result Value Ref Range    WBC 7.54 3.90 - 12.70 K/uL    RBC 4.16 4.00 - 5.40 M/uL    HGB 10.2 (L) 12.0 - 16.0 gm/dL    HCT 32.9 (L) 37.0 - 48.5 %    MCV 79 (L) 82 - 98 fL    MCH 24.5 (L) 27.0 - 31.0 pg    MCHC 31.0 (L) 32.0 - 36.0 g/dL    RDW 18.0 (H) 11.5 - 14.5 %    Platelet Count 447 150 - 450 K/uL    MPV 9.1 (L) 9.2 - 12.9 fL    Nucleated RBC 0 <=0 /100 WBC    Neut % 70.4 38 - 73 %    Lymph % 22.7 18 - 48 %    Mono % 5.3 4 - 15 %    Eos % 0.5 <=8 %    Basophil % 0.8 <=1.9 %    Imm Grans % 0.3 0.0 - 0.5 %    Neut # 5.31 1.8 - 7.7 K/uL    Lymph # 1.71 1 - 4.8 K/uL    Mono # 0.40 0.3 - 1 K/uL    Eos # 0.04 <=0.5 K/uL    Baso # 0.06 <=0.2 K/uL    Imm Grans # 0.02 0.00 - 0.04 K/uL   Type & Screen    Collection Time: 06/19/25  3:50 PM   Result Value Ref Range    Specimen Outdate 06/22/2025 23:59     Group & Rh B POS     Indirect Felipe NEG    D dimer, quantitative    Collection Time: 06/19/25  4:48 PM   Result Value Ref Range    D-Dimer 0.29 <0.50 mg/L FEU       Imaging Results:  Imaging Results              X-Ray Chest 1 View (Final result)  Result time 06/19/25 14:37:31      Final result by Cj Szymanski MD (06/19/25 14:37:31)                   Impression:      No acute findings.      Electronically signed by: Cj Szymanski MD  Date:    06/19/2025  Time:    14:37               Narrative:    EXAMINATION:  XR CHEST 1 VIEW    CLINICAL HISTORY:  SOB, shortness of breath;    COMPARISON:  11/09/2023    FINDINGS:  Heart size is normal. The lung fields are clear. No acute cardiopulmonary infiltrate.                                       The EKG was ordered, reviewed, and independently interpreted by the ED provider.  Interpretation time: 13:32  Rate: 120 BPM  Rhythm: sinus tachycardia  Interpretation: Nonspecific T wave  abnormality. No STEMI.    The EKG was ordered, reviewed, and independently interpreted by the ED provider.  Interpretation time: 17:51  Rate: 88 BPM  Rhythm: normal sinus rhythm with sinus arrhythmia  Interpretation: Normal EKG. No STEMI.           The Emergency Provider reviewed the vital signs and test results, which are outlined above.     ED Discussion     7:00 PM: Re-evaluated pt. Tachycardia was resolved with a L of fluid. Will start on potassium supplements for low potassium, Mildly anemia, can be addressed by PCP and she is getting Iron transfusion next week. . Patient is resting comfortably and is in no acute distress.  Discussed with pt and/or family/caretaker all pertinent results. Discussed with pt and/or family/caretaker any concerns expressed at this time. Answered all questions. Pt and/or family/caretaker express understanding at this time.    7:43 PM: Reassessed pt at this time. HR is 85 at this time; pt is stable for discharge. Plan to put on potassium supplements and will send home with a cardiology referral due to HR jumping up to 140-150 when ambulating. Scheduled for an iron infusion next week. Discussed with patient and/or family/caretaker all pertinent ED information and results. Discussed pt dx and plan of tx. Gave the patient all f/u and return to the ED instructions. All questions and concerns were addressed at this time. Patient and/or family/caretaker expresses understanding of information and instructions, and is comfortable with plan to discharge. Pt is stable for discharge.     I discussed with patient and/or family/caretaker that evaluation in the ED does not suggest any emergent or life threatening medical conditions requiring immediate intervention beyond what was provided in the ED, and I believe patient is safe for discharge. Regardless, an unremarkable evaluation in the ED does not preclude the development or presence of a serious or life threatening condition. As such, I  instructed that the patient is to return immediately for any worsening or change in current symptoms.         Medical Decision Making  Amount and/or Complexity of Data Reviewed  Labs: ordered. Decision-making details documented in ED Course.  Radiology: ordered. Decision-making details documented in ED Course.  ECG/medicine tests: ordered and independent interpretation performed. Decision-making details documented in ED Course.    Risk  Prescription drug management.  Risk Details: Differential diagnoses:  Pneumonia, Congestive heart failure, Acute Myocardial infarction, Pleural effusion, Pulmonary edema, Pulmonary embolism, Electrolyte imbalance, Infectious etiology, COPD exacerbation, Asthma exacerbation, Pneumothorax,  Cardiac tamponade, Anemia, Deconditioning, bronchospasm, upper airway obstruction such: Aspiration, Foreign body                  ED Medication(s):  Medications   sodium chloride 0.9% bolus 1,000 mL 1,000 mL (1,000 mLs Intravenous New Bag 6/19/25 1650)   potassium chloride SA CR tablet 40 mEq (40 mEq Oral Given 6/19/25 1652)       New Prescriptions    POTASSIUM CHLORIDE SA (K-DUR,KLOR-CON) 20 MEQ TABLET    Take 1 tablet (20 mEq total) by mouth 2 (two) times daily.        Follow-up Information       Kimberly Yañez MD. Schedule an appointment as soon as possible for a visit in 2 days.    Specialty: Family Medicine  Contact information:  [address] [place] [address] [address] Nancy RUIZ [address] [phone]                                 Scribe Attestation:   Scribe #1: I performed the above scribed service and the documentation accurately describes the services I performed. I attest to the accuracy of the note.     Attending:   Physician Attestation Statement for Scribe #1: I, Manoj Oliva MD, personally performed the services described in this documentation, as scribed by Kitty Horton, in my presence, and it is both accurate and complete.           Clinical Impression       ICD-10-CM ICD-9-CM    1. Sinus tachycardia  R00.0 427.89   2. SOB (shortness of breath)  R06.02 786.05   3. Tachycardia  R00.0 785.0   4. Hypokalemia  E87.6 276.8       Disposition:   Disposition: Discharged  Condition: Stable       Manoj Oliva MD  06/19/25 1950

## 2025-06-20 LAB
OHS QRS DURATION: 84 MS
OHS QTC CALCULATION: 462 MS

## 2025-06-20 NOTE — DISCHARGE INSTRUCTIONS
Potassium has been prescribed for your low potassium  Make sure to get your iron transfusion next week  Cardiology referral has been done for your tachycardia or fast heart rate    Return to emergency department if you develop:  - Sustained Fever >100.4 or low temperature <96.8   - Tachycardia (very high heart rate) or Pulse >90  - Hypotension (low blood pressure) a top number (systolic pressure) of <90 or a bottom number (diastolic pressure) of <40 or lower  - Hypertension (high blood pressure) a top number (systolic pressure) of >180 or a bottom number (diastolic pressure) of >120 or higher  - Severe pain not relieved with recommended pain control regimen  - Severe shortness of breath above baseline  - Severe nausea, vomiting, inability to tolerate oral feeding/hydration  - Altered mental status, abnormal interaction or behaviors   - If symptoms acutely worsen or do not improve  - Development of other worrisome symptom

## 2025-06-23 ENCOUNTER — E-CONSULT (OUTPATIENT)
Dept: CARDIOLOGY | Facility: CLINIC | Age: 30
End: 2025-06-23
Payer: MEDICAID

## 2025-06-23 DIAGNOSIS — Z01.810 PREOP CARDIOVASCULAR EXAM: Primary | ICD-10-CM

## 2025-06-23 NOTE — CONSULTS
[facility] - Cardiology  Response for E-Consult     Patient Name: Zoila Sheldon  MRN: [id number]  Primary Care Provider: Kimberly Yañez MD   Requesting Provider: Carlie Mccurdy FNP  E-Consult to General Cardiology  Consult performed by: Mil Harris MD  Consult ordered by: Carlie Mccurdy FNP          E consult for preop clearance of hysteroscopy  The chart reviewed.  PMH PTSD  EKG NSR     Plan  LOW periop risk of CV events for non-high risk procedure.  Ok to proceed the scheduled procedure without further cardiac study.      Total time of Consultation: 10 minute    I did not speak to the requesting provider verbally about this.     *This eConsult is based on the clinical data available to me and is furnished without benefit of a physical examination. The eConsult will need to be interpreted in light of any clinical issues or changes in patient status not available to me at the time of filing this eConsults. Significant changes in patient condition or level of acuity should result in immediate formal consultation and reevaluation. Please alert me if you have further questions.    Thank you for this eConsult referral.     Mil Harris MD  [facility] - [facility]

## 2025-06-25 ENCOUNTER — PATIENT MESSAGE (OUTPATIENT)
Dept: PSYCHIATRY | Facility: CLINIC | Age: 30
End: 2025-06-25
Payer: MEDICAID

## 2025-06-25 DIAGNOSIS — F31.32 BIPOLAR AFFECTIVE DISORDER, CURRENTLY DEPRESSED, MODERATE: Primary | ICD-10-CM

## 2025-06-25 DIAGNOSIS — G47.00 INSOMNIA, UNSPECIFIED TYPE: ICD-10-CM

## 2025-06-27 ENCOUNTER — TELEPHONE (OUTPATIENT)
Dept: OBSTETRICS AND GYNECOLOGY | Facility: CLINIC | Age: 30
End: 2025-06-27
Payer: MEDICAID

## 2025-06-27 ENCOUNTER — TELEPHONE (OUTPATIENT)
Dept: PREADMISSION TESTING | Facility: HOSPITAL | Age: 30
End: 2025-06-27
Payer: MEDICAID

## 2025-06-27 RX ORDER — BUPROPION HYDROCHLORIDE 150 MG/1
150 TABLET ORAL DAILY
Qty: 30 TABLET | Refills: 2 | Status: SHIPPED | OUTPATIENT
Start: 2025-06-27

## 2025-06-27 RX ORDER — LAMOTRIGINE 150 MG/1
150 TABLET ORAL DAILY
Qty: 30 TABLET | Refills: 2 | Status: SHIPPED | OUTPATIENT
Start: 2025-06-27 | End: 2025-09-25

## 2025-06-27 RX ORDER — QUETIAPINE FUMARATE 50 MG/1
25-50 TABLET, FILM COATED ORAL NIGHTLY
Qty: 30 TABLET | Refills: 2 | Status: SHIPPED | OUTPATIENT
Start: 2025-06-27

## 2025-06-27 NOTE — TELEPHONE ENCOUNTER
Pre op instructions reviewed with Pt per phone.      To confirm, your doctor has instructed you: Surgery is scheduled for 7/03/25.   Pre admit office will call the afternoon prior to surgery between 1PM and 3PM with arrival time.    Surgery will be at [facility],  The address is [address] [address]. [place] [address].      IMPORTANT INSTRUCTIONS!    Do not eat or drink after 12 midnight, including water. Do not smoke or use chewing tobacco after 12 midnight!  OK to brush teeth, but no gum, candy, or mints!      *Take only these medicines with a small swallow of water-morning of surgery*     BUSPIRONE  LAMICTAL         ____ Stop taking all vitamins, herbal supplements, Aspirin, & NSAIDS (Ibuprofen, Advil, Aleve) 7 days prior to surgery, as these can thin your blood.    ____ Weight loss medication, such as Adipex and Phentermine, must be stopped 14 days prior to surgery, no exceptions!    *Diabetic Patients: If you take diabetic or weight loss medication, do NOT take morning of surgery unless instructed by Doctor. Metformin to be stopped 24 hrs prior to surgery. DO NOT take short-acting insulin the day of surgery. Only take HALF of your regular dose of long-acting insulin the night before surgery, unless instructed otherwise. Blood sugars will be checked in pre-op.   ~Ozempic/Mounjaro/Wegovy/Trulicity/Semaglutide injections must be stopped 7 days prior to surgery.     Please notify MD office if you develop an active infection, are prescribed antibiotics by someone other than the surgeon doing your surgery, or visit urgent care/ER.    Bathing Instructions:   The night before surgery and the morning prior to coming to the hospital:    - Shower & rinse your body as usual with anti-bacterial Soap (Dial or Lever 2000)   -Hibiclens (if indicated) use AFTER anti-bacterial soap; 1 packet PM/1 packet in AM on surgical site only   -Do not use hibiclens on your head, face, or genitals.    -Do not wash with  anti-bacterial soap after you use the hibiclens.    -Do not shave surgical site 5-7 days prior to surgery.    -Pubic hair 7 days prior to surgery (OBGYN/Urology only).       Additional Instructions:   __ No makeup, powder, lotions, creams, or body spray to skin   __ No deodorant if you are having: breast procedure, PORT insertion, or shoulder surgery!   __ No nail polish or artificial nails due to risk of infection.             **SURGERY MAY BE CANCELLED AT SURGEON'S DISCRETION IF ARTIFICIAL/NAIL POLISH IS PRESENT!!!**  __ Please remove all piercings and leave all jewelry at home.    **SURGERY WILL BE CANCELLED IF PIERCINGS ARE PRESENT!!!**    __ Dentures, Hearing Aids and Contact Lens need to be removed prior to the start of surgery.    __ Avoid Alcoholic beverages 3 days prior to surgery, as it can thin the blood, unless told otherwise by pre-admit department.  __ Females: may need to give a urine sample the morning of surgery;   **Please ask  for a specimen cup if you need to use the restroom prior to being called into pre-op.**  __ Males: Stop ED medications (Viagra, Cialis) 24 hrs prior to surgery.  __ You must have transportation, and they MUST stay the entire time.   __  Bring photo ID and insurance information to hospital    What to Wear:  Clean, loose-fitting clothing. Please allow for dressings/bandages/surgical equipment/drains.   ~Breast Patients: We recommend a button up shirt so you do not have to lift your arms.   Amazon Link recommended by breast surgeons if you will have drains in place: https://a.co/d/vQUW6jH  ~Shoulder Patients: We recommend a button up shirt. If unavailable, an oversized t-shirt (2 sizes bigger than your normal size) or a stretchy dress will also work.   Amazon Link for hospital type button sleeve t-shirt: https://a.co/d/86BAbRk  ~Knee Patients: We recommend oversized pants/shorts or a dress to accommodate any knee braces in place. Braces will not be removed to get  dressed.    [facility] Visitor/Ride Policy:    Only 1 adult allowed (18 or older) to accompany you and MUST STAY through the entire admission length.      -Must have a ride home from a responsible adult that you know and trust.     -Medical Transport, Uber or Lyft can only be used if patient has a responsible adult to   accompany them during ride home.      ~Your ride MUST STAY the entire time until you are discharged~   Please notify the pre-admit department prior to surgery if you use medication transportation so we can verify your arrival/pickup time!   -The patient is responsible for setting up their own transportation!    Discharge Prescriptions:  Your discharge prescriptions will be sent next door to [facility], unless otherwise discussed with your surgeon. Your  will be responsible for calling the pharmacy ([phone]) to begin the prescription filling process. They will receive a text message with instructions once you are in recovery. Please make sure we have your insurance information and you bring a payment method (cash or card) if needed for prescriptions. If you have  insurance, please let the pre-op nurse know, as the pharmacy does not take this insurance.     *If you are running late or have questions the morning of surgery, please call the Pre-OP Department @ [phone].       *Please Call [facility] Pre-Admit Department for surgery instruction questions: (M-F 8AM-4:30PM)  [phone]     Financial Questions:  [place]: [phone]  Hours ~ 5AM-1:30PM  Monday-Friday    Billing Question Numbers:   [phone]

## 2025-06-27 NOTE — TELEPHONE ENCOUNTER
Contacted pt, verified 2 pt identifiers, informed pt I will return phone call regarding question once provider arrives in office this afternoon, pt cem.

## 2025-06-27 NOTE — TELEPHONE ENCOUNTER
Contacted pt, verified 2 pt identifiers, informed pt per provider it is safe to get iron infusion prior to surgery with RANULFO Will MD. Pt vu.

## 2025-06-27 NOTE — TELEPHONE ENCOUNTER
----- Message from TERA Lopez sent at 6/27/2025  9:43 AM CDT -----  Regarding: SURGERY 7/03  Good Morning.    Pt scheduled for surgery on 7/03/25.  Pt requesting a call back.  Pt has questions regarding surgery.    Pt is scheduled for iron infusion the day prior to surgery. Pt is asking if she can proceed with her infusion or should she hold of on her infusion prior to surgery?    Thank you,    Jessica CARLOS

## 2025-06-29 ENCOUNTER — PATIENT MESSAGE (OUTPATIENT)
Dept: HEMATOLOGY/ONCOLOGY | Facility: CLINIC | Age: 30
End: 2025-06-29
Payer: MEDICAID

## 2025-06-30 ENCOUNTER — ANESTHESIA EVENT (OUTPATIENT)
Dept: SURGERY | Facility: HOSPITAL | Age: 30
End: 2025-06-30
Payer: MEDICAID

## 2025-06-30 ENCOUNTER — HOSPITAL ENCOUNTER (OUTPATIENT)
Dept: ENDOSCOPY | Facility: HOSPITAL | Age: 30
Discharge: HOME | End: 2025-06-30
Attending: NURSE PRACTITIONER | Admitting: INTERNAL MEDICINE
Payer: MEDICAID

## 2025-06-30 ENCOUNTER — ANESTHESIA EVENT (OUTPATIENT)
Dept: ENDOSCOPY | Facility: HOSPITAL | Age: 30
End: 2025-06-30
Payer: MEDICAID

## 2025-06-30 ENCOUNTER — ANESTHESIA (OUTPATIENT)
Dept: ENDOSCOPY | Facility: HOSPITAL | Age: 30
End: 2025-06-30
Payer: MEDICAID

## 2025-06-30 DIAGNOSIS — D50.9 IDA (IRON DEFICIENCY ANEMIA): ICD-10-CM

## 2025-06-30 DIAGNOSIS — R63.0 DECREASED APPETITE: ICD-10-CM

## 2025-06-30 DIAGNOSIS — R11.0 NAUSEA: ICD-10-CM

## 2025-06-30 DIAGNOSIS — D50.9 IRON DEFICIENCY ANEMIA, UNSPECIFIED IRON DEFICIENCY ANEMIA TYPE: Primary | ICD-10-CM

## 2025-06-30 LAB
B-HCG UR QL: NEGATIVE
CTP QC/QA: YES

## 2025-06-30 PROCEDURE — 27201012 HC FORCEPS, HOT/COLD, DISP: Performed by: INTERNAL MEDICINE

## 2025-06-30 PROCEDURE — 81025 URINE PREGNANCY TEST: CPT | Performed by: INTERNAL MEDICINE

## 2025-06-30 PROCEDURE — 43239 EGD BIOPSY SINGLE/MULTIPLE: CPT | Performed by: INTERNAL MEDICINE

## 2025-06-30 PROCEDURE — 63600175 PHARM REV CODE 636 W HCPCS

## 2025-06-30 PROCEDURE — 37000008 HC ANESTHESIA 1ST 15 MINUTES

## 2025-06-30 PROCEDURE — 88305 TISSUE EXAM BY PATHOLOGIST: CPT | Mod: TC | Performed by: INTERNAL MEDICINE

## 2025-06-30 PROCEDURE — 43239 EGD BIOPSY SINGLE/MULTIPLE: CPT | Mod: ,,, | Performed by: INTERNAL MEDICINE

## 2025-06-30 RX ORDER — PROPOFOL 10 MG/ML
VIAL (ML) INTRAVENOUS
Status: DISCONTINUED | OUTPATIENT
Start: 2025-06-30 | End: 2025-06-30

## 2025-06-30 RX ORDER — LIDOCAINE HYDROCHLORIDE 20 MG/ML
INJECTION INTRAVENOUS
Status: DISCONTINUED | OUTPATIENT
Start: 2025-06-30 | End: 2025-06-30

## 2025-06-30 RX ADMIN — PROPOFOL 30 MG: 10 INJECTION, EMULSION INTRAVENOUS at 08:06

## 2025-06-30 RX ADMIN — LIDOCAINE HYDROCHLORIDE 100 MG: 20 INJECTION INTRAVENOUS at 08:06

## 2025-06-30 RX ADMIN — PROPOFOL 110 MG: 10 INJECTION, EMULSION INTRAVENOUS at 08:06

## 2025-06-30 NOTE — TRANSFER OF CARE
"Anesthesia Transfer of Care Note    Patient: Zoila Soriano Monalisa    Procedure(s) Performed: * No procedures listed *    Patient location: GI    Anesthesia Type: MAC    Transport from OR: Transported from OR on room air with adequate spontaneous ventilation    Post pain: adequate analgesia    Post assessment: no apparent anesthetic complications    Post vital signs: stable    Level of consciousness: awake    Nausea/Vomiting: no nausea/vomiting    Complications: none    Transfer of care protocol was followed      Last vitals: Visit Vitals  /77   Pulse 83   Temp 36.5 °C (97.7 °F)   Resp 18   Ht 5' 5" (1.651 m)   Wt 74.4 kg (164 lb)   LMP 05/20/2025 (Exact Date)   SpO2 100%   Breastfeeding No   BMI 27.29 kg/m²     "

## 2025-06-30 NOTE — ANESTHESIA POSTPROCEDURE EVALUATION
Anesthesia Post Evaluation    Patient: Zoila Sheldon    Procedure(s) Performed: * No procedures listed *    Final Anesthesia Type: MAC      Patient location during evaluation: GI PACU  Patient participation: Yes- Able to Participate  Level of consciousness: awake and alert  Post-procedure vital signs: reviewed and stable  Pain management: adequate  Airway patency: patent    PONV status at discharge: No PONV  Anesthetic complications: no      Cardiovascular status: blood pressure returned to baseline and hemodynamically stable  Respiratory status: unassisted, spontaneous ventilation and room air  Hydration status: euvolemic  Follow-up not needed.              Vitals Value Taken Time   /81 06/30/25 08:41   Temp 36.2 °C (97.2 °F) 06/30/25 08:21   Pulse 81 06/30/25 08:41   Resp 18 06/30/25 08:41   SpO2 100 % 06/30/25 08:41         Event Time   Out of Recovery 08:58:06         Pain/Kwame Score: Kwame Score: 10 (6/30/2025  8:31 AM)

## 2025-06-30 NOTE — BRIEF OP NOTE
Outpatient Endoscopy Brief Operative Note      Admit Date: 6/30/2025    Discharge Date and Time:  6/30/2025 8:19 AM    Attending Physician: Clover Burns NP     Discharge Physician: Clover Burns NP     Principal Admitting Diagnoses: Iron deficiency anemia         Discharge Diagnosis: Diagnoses of Nausea, Decreased appetite, and JOLENE (iron deficiency anemia) were pertinent to this visit.     Discharged Condition: Good    Indication for Admission: Iron deficiency anemia     Hospital Course: Patient was admitted for an outppatient procedure and tolerated the procedure well with no complications.    Significant Diagnostic Studies: EGD with biopsy    See LUMENS report    Pathology (if any):  Specimen (24h ago, onward)       Start     Ordered    06/30/25 0817  Specimen to Pathology Gastrointestinal tract  RELEASE UPON ORDERING        References:    Click here for ordering Quick Tip   Question:  Release to patient  Answer:  Immediate    06/30/25 0817                    Estimated Blood Loss: 1 ml.    Discussed with: patient.    Disposition: Home.    Follow Up/Patient Instructions:   Patient's Medications   New Prescriptions    No medications on file   Previous Medications    ATOMOXETINE (STRATTERA) 40 MG CAPSULE    Take 1 capsule (40 mg total) by mouth once daily.    BUPROPION (WELLBUTRIN XL) 150 MG TB24 TABLET    Take 1 tablet (150 mg total) by mouth once daily.    BUSPIRONE (BUSPAR) 10 MG TABLET    Take 1 tablet (10 mg total) by mouth 2 (two) times daily.    CLONAZEPAM (KLONOPIN) 0.5 MG TABLET    Take 1 tablet (0.5 mg total) by mouth 2 (two) times daily as needed for Anxiety.    FERROUS GLUCONATE (FERGON) 324 MG TABLET    Take 1 tablet (324 mg total) by mouth daily with breakfast.    LAMOTRIGINE (LAMICTAL) 150 MG TAB    Take 1 tablet (150 mg total) by mouth once daily.    QUETIAPINE (SEROQUEL) 50 MG TABLET    Take 0.5-1 tablets (25-50 mg total) by mouth nightly.   Modified Medications    No medications on file    Discontinued Medications    POTASSIUM CHLORIDE SA (K-DUR,KLOR-CON) 20 MEQ TABLET    Take 1 tablet (20 mEq total) by mouth 2 (two) times daily.       Discharge Procedure Orders   Diet general     Call MD for:  temperature >100.4     Call MD for:  persistent nausea and vomiting     Call MD for:  severe uncontrolled pain     Call MD for:  difficulty breathing, headache or visual disturbances     Activity as tolerated        Follow-up Information       Clover Burns, NP Follow up.    Specialty: Hematology and Oncology  Contact information:  [address]  [place] [id number] [phone]                                 Kathi Stevenson MD  Inpatient Gastroenterology  [facility] [place]

## 2025-06-30 NOTE — H&P
PRE PROCEDURE H&P    Patient Name: Zoila Sheldon  MRN: [id number]  : 1995  Date of Procedure:  2025  Referring Physician: Clover Burns NP  Primary Physician: Kimberly Yañez MD  Procedure Physician: Kathi Stevenson MD       Planned Procedure: EGD  Diagnosis: iron deficiency anemia      Chief Complaint: Same as above    HPI: Patient is an 29 y.o. female is here for the above. Referred by Clover Burns NP for JOLENE. Patient has hx of heavy menstrual cycles and is to undergo ablation on 7/3/25 with Ob-Gyn. No previous endoscopic evaluation. Reports Fhx of celiac disease in her mother. Denies having colonoscopy in past.     Last colonoscopy: none  Family history: none  Anticoagulation: none    Past Medical History:   Past Medical History:   Diagnosis Date    Anemia, unspecified     Anxiety disorder, unspecified     Bipolar disorder, unspecified     Borderline personality disorder     Depression     Pituitary tumor         Past Surgical History:  Past Surgical History:   Procedure Laterality Date    INDUCED       Surgical    LAPAROSCOPIC SALPINGECTOMY Bilateral 2025    Procedure: SALPINGECTOMY, LAPAROSCOPIC;  Surgeon: DEBBIE Will MD;  Location: [facility];  Service: OB/GYN;  Laterality: Bilateral;    [place] teeth surgery          Home Medications:  Prior to Admission medications    Medication Sig Start Date End Date Taking? Authorizing Provider   atomoxetine (STRATTERA) 40 MG capsule Take 1 capsule (40 mg total) by mouth once daily. 25 Yes Alyssa Estes, PhD   busPIRone (BUSPAR) 10 MG tablet Take 1 tablet (10 mg total) by mouth 2 (two) times daily. 25  Yes Alyssa Estes, PhD   clonazePAM (KLONOPIN) 0.5 MG tablet Take 1 tablet (0.5 mg total) by mouth 2 (two) times daily as needed for Anxiety. 25 Yes Alyssa Estes, PhD   lamoTRIgine (LAMICTAL) 150 MG Tab Take 1 tablet (150 mg total) by mouth once daily. 25 Yes Franklyn  "Alyssa BARRY PhD   QUEtiapine (SEROQUEL) 50 MG tablet Take 0.5-1 tablets (25-50 mg total) by mouth nightly. 6/27/25  Yes Alyssa Estes PhD   buPROPion (WELLBUTRIN XL) 150 MG TB24 tablet Take 1 tablet (150 mg total) by mouth once daily. 6/27/25   Alyssa Estes, PhD   ferrous gluconate (FERGON) 324 MG tablet Take 1 tablet (324 mg total) by mouth daily with breakfast. 2/6/25 2/6/26  Kimberly Yañez MD   potassium chloride SA (K-DUR,KLOR-CON) 20 MEQ tablet Take 1 tablet (20 mEq total) by mouth 2 (two) times daily. 6/19/25   Manoj Oliva MD        Allergies:  Review of patient's allergies indicates:   Allergen Reactions    Lactase Diarrhea    Milk containing products (dairy) Diarrhea and Nausea And Vomiting        Social History:   Social History[1]    Family History:  Family History   Problem Relation Name Age of Onset    Depression Mother      Anxiety Mother      ADD / ADHD Mother      Bipolar disorder Father      ADD / ADHD Sister      Prostate cancer Maternal Grandfather      Celiac disease Maternal Grandfather      Colon cancer Paternal Grandfather         ROS: No acute cardiac events, no acute respiratory complaints.     Physical Exam (all patients):    /77   Pulse 83   Temp 97.7 °F (36.5 °C)   Resp 18   Ht 5' 5" (1.651 m)   Wt 74.4 kg (164 lb)   LMP 05/20/2025 (Exact Date)   SpO2 100%   Breastfeeding No   BMI 27.29 kg/m²   Lungs: Clear to auscultation bilaterally, respirations unlabored  Heart: Regular rate and rhythm, S1 and S2 normal, no obvious murmurs  Abdomen:         Soft, non-tender, bowel sounds normal, no masses, no organomegaly    Lab Results   Component Value Date    WBC 7.54 06/19/2025    MCV 79 (L) 06/19/2025    RDW 18.0 (H) 06/19/2025     06/19/2025    GLU 89 06/19/2025    HGBA1C 4.9 01/30/2025    BUN 7 06/19/2025     06/19/2025    K 3.4 (L) 06/19/2025     06/19/2025        SEDATION PLAN: per anesthesia      History reviewed, vital signs satisfactory, " cardiopulmonary status satisfactory, sedation options, risks and plans have been discussed with the patient  All their questions were answered and the patient agrees to the sedation procedures as planned and the patient is deemed an appropriate candidate for the sedation as planned.    The risks, benefits and alternatives of the procedure were discussed with the patient in detail. This discussion was had in the presence of endoscopy staff. The risks include, risks of adverse reaction to sedation requiring the use of reversal agents, bleeding requiring blood transfusion, perforation requiring surgical intervention and technical failure. Other risks include aspiration leading to respiratory distress and respiratory failure resulting in endotracheal intubation and mechanical ventilation including death. If anesthesia is being utilized for this procedure, it is up to the anesthesiologist to determine airway safety including elective endotracheal intubation. Questions were answered, they agree to proceed. There was no language barriers.      Procedure explained to patient, informed consent obtained and placed in chart.    Kathi Stevenson  6/30/2025  8:03 AM          [1]   Social History  Socioeconomic History    Marital status: Single   Tobacco Use    Smoking status: Never    Smokeless tobacco: Never   Vaping Use    Vaping status: Never Used   Substance and Sexual Activity    Alcohol use: Not Currently    Drug use: Not Currently     Types: Marijuana     Comment: smokes: 1gm/wk    Sexual activity: Not Currently     Partners: Male     Social Drivers of Health     Financial Resource Strain: Medium Risk (6/6/2025)    Overall Financial Resource Strain (CARDIA)     Difficulty of Paying Living Expenses: Somewhat hard   Food Insecurity: Food Insecurity Present (6/6/2025)    Hunger Vital Sign     Worried About Running Out of Food in the Last Year: Sometimes true     Ran Out of Food in the Last Year: Sometimes true    Transportation Needs: Unmet Transportation Needs (6/6/2025)    PRAPARE - Transportation     Lack of Transportation (Medical): Yes     Lack of Transportation (Non-Medical): Patient declined   Physical Activity: Sufficiently Active (6/6/2025)    Exercise Vital Sign     Days of Exercise per Week: 4 days     Minutes of Exercise per Session: 40 min   Stress: Stress Concern Present (6/6/2025)    [employer] [place] [employer] - Occupational Stress Questionnaire     Feeling of Stress : Very much   Housing Stability: Low Risk  (6/6/2025)    Housing Stability Vital Sign     Unable to Pay for Housing in the Last Year: No     Number of Times Moved in the Last Year: 0     Homeless in the Last Year: No

## 2025-06-30 NOTE — ANESTHESIA PREPROCEDURE EVALUATION
2025  Zoila Sheldon is a 29 y.o., female.    Problem List[1]  Past Medical History:   Diagnosis Date    Anemia, unspecified     Anxiety disorder, unspecified     Bipolar disorder, unspecified     Borderline personality disorder     Depression     Pituitary tumor      Past Surgical History:   Procedure Laterality Date    INDUCED       Surgical    LAPAROSCOPIC SALPINGECTOMY Bilateral 2025    Procedure: SALPINGECTOMY, LAPAROSCOPIC;  Surgeon: DEBBIE Will MD;  Location: [facility];  Service: OB/GYN;  Laterality: Bilateral;    [place] teeth surgery           Pre-op Assessment    I have reviewed the Patient Summary Reports.     I have reviewed the Nursing Notes. I have reviewed the NPO Status.   I have reviewed the Medications.     Review of Systems  Anesthesia Hx:               Denies Personal Hx of Anesthesia complications.                    Social:  Non-Smoker, No Alcohol Use       Hematology/Oncology:       -- Anemia:                                  Cardiovascular:  Exercise tolerance: good                 ECG has been reviewed.                            Pulmonary:  Pulmonary Normal                       Neurological:           Pituitary tumor                             Endocrine:  Endocrine Normal            Psych:  Psychiatric History anxiety               Results for orders placed or performed during the hospital encounter of 25   Repeat EKG 12-lead    Collection Time: 25  5:51 PM   Result Value Ref Range    QRS Duration 84 ms    [facility] QTC Calculation 462 ms    Narrative    Test Reason : R00.0,    Vent. Rate :  88 BPM     Atrial Rate :  88 BPM     P-R Int : 142 ms          QRS Dur :  84 ms      QT Int : 382 ms       P-R-T Axes :  70  65  50 degrees    QTcB Int : 462 ms    Normal sinus rhythm with sinus arrhythmia  Normal ECG  No previous ECGs  available  Confirmed by Shantanu Veloz ([id number]) on 6/20/2025 5:15:41 PM    Referred By: AAAREFERRAL SELF           Confirmed By: Shantanu Veloz         Physical Exam  General: Well nourished, Cooperative, Alert and Oriented    Airway:  Mallampati: II   Mouth Opening: Normal  TM Distance: Normal  Tongue: Normal  Neck ROM: Normal ROM    Dental:  Intact    Chest/Lungs:  Normal Respiratory Rate    Heart:  Rate: Normal  Rhythm: Regular Rhythm        Anesthesia Plan  Type of Anesthesia, risks & benefits discussed:    Anesthesia Type: MAC, Gen Natural Airway  Intra-op Monitoring Plan: Standard ASA Monitors  Induction:  IV  Informed Consent: Informed consent signed with the Patient and all parties understand the risks and agree with anesthesia plan.  All questions answered.   ASA Score: 2  Day of Surgery Review of History & Physical: H&P Update referred to the surgeon/provider.    Ready For Surgery From Anesthesia Perspective.     .           [1]   Patient Active Problem List  Diagnosis    Encounter for consultation for female sterilization    Bipolar affective disorder, currently depressed, moderate    Borderline personality disorder    Pituitary tumor    Hypokalemia    Iron deficiency anemia    PTSD (post-traumatic stress disorder)    ALEXIA (generalized anxiety disorder)    Preop cardiovascular exam

## 2025-06-30 NOTE — TELEPHONE ENCOUNTER
Can you please schedule her for ttg iga and iga lab at [place] [address] on Wednesday?    Chase Pearl

## 2025-06-30 NOTE — PLAN OF CARE
Kandis  at bedside to speak with pt about the results of the procedure. All questions answered with no further questions at this time.

## 2025-07-01 VITALS
OXYGEN SATURATION: 100 % | HEART RATE: 81 BPM | BODY MASS INDEX: 27.32 KG/M2 | DIASTOLIC BLOOD PRESSURE: 81 MMHG | WEIGHT: 164 LBS | TEMPERATURE: 97 F | HEIGHT: 65 IN | SYSTOLIC BLOOD PRESSURE: 124 MMHG | RESPIRATION RATE: 18 BRPM

## 2025-07-01 LAB
ESTROGEN SERPL-MCNC: NORMAL PG/ML
INSULIN SERPL-ACNC: NORMAL U[IU]/ML
LAB AP CLINICAL INFORMATION: NORMAL
LAB AP GROSS DESCRIPTION: NORMAL
LAB AP PERFORMING LOCATION(S): NORMAL
LAB AP REPORT FOOTNOTES: NORMAL

## 2025-07-01 NOTE — ANESTHESIA PREPROCEDURE EVALUATION
2025  Zoila Sheldon is a 29 y.o., female.    Past Medical History:   Diagnosis Date    Anemia, unspecified     Anxiety disorder, unspecified     Bipolar disorder, unspecified     Borderline personality disorder     Depression     Pituitary tumor      Past Surgical History:   Procedure Laterality Date    INDUCED       Surgical    LAPAROSCOPIC SALPINGECTOMY Bilateral 2025    Procedure: SALPINGECTOMY, LAPAROSCOPIC;  Surgeon: DEBBIE Will MD;  Location: [facility];  Service: OB/GYN;  Laterality: Bilateral;    [place] teeth surgery         Pre-op Assessment    I have reviewed the Patient Summary Reports.    I have reviewed the NPO Status.   I have reviewed the Medications.     Review of Systems  Anesthesia Hx:  No problems with previous Anesthesia   History of prior surgery of interest to airway management or planning:  Previous anesthesia: General       Airway issues documented on chart review include mask, easy, easy direct laryngoscopy     Denies Family Hx of Anesthesia complications.    Denies Personal Hx of Anesthesia complications.                    Social:  Non-Smoker       Hematology/Oncology:       -- Anemia:                                  Cardiovascular:  Cardiovascular Normal                                              Pulmonary:  Pulmonary Normal                       Renal/:  Renal/ Normal                 Hepatic/GI:  Hepatic/GI Normal                    Endocrine:  Endocrine Normal            Psych:  Psychiatric History anxiety depression                Physical Exam  General: Alert and Oriented    Airway:  Mallampati: II   Mouth Opening: Normal  TM Distance: Normal  Tongue: Normal  Neck ROM: Normal ROM    Dental:  Intact    Chest/Lungs:  Clear to auscultation, Normal Respiratory Rate    Heart:  Rate: Normal  Rhythm: Regular Rhythm        Anesthesia  Plan  Type of Anesthesia, risks & benefits discussed:    Anesthesia Type: Gen ETT, Gen Supraglottic Airway  Intra-op Monitoring Plan: Standard ASA Monitors  Post Op Pain Control Plan: multimodal analgesia and IV/PO Opioids PRN  Induction:  IV  Informed Consent: Informed consent signed with the Patient and all parties understand the risks and agree with anesthesia plan.  All questions answered. Patient consented to blood products? No  ASA Score: 2  Day of Surgery Review of History & Physical: H&P Update referred to the surgeon/provider.    Ready For Surgery From Anesthesia Perspective.     .

## 2025-07-02 ENCOUNTER — PATIENT MESSAGE (OUTPATIENT)
Dept: RESPIRATORY THERAPY | Facility: HOSPITAL | Age: 30
End: 2025-07-02
Payer: MEDICAID

## 2025-07-02 ENCOUNTER — INFUSION (OUTPATIENT)
Dept: INFUSION THERAPY | Facility: HOSPITAL | Age: 30
End: 2025-07-02
Attending: NURSE PRACTITIONER
Payer: MEDICAID

## 2025-07-02 VITALS
TEMPERATURE: 98 F | RESPIRATION RATE: 16 BRPM | SYSTOLIC BLOOD PRESSURE: 111 MMHG | DIASTOLIC BLOOD PRESSURE: 74 MMHG | HEART RATE: 90 BPM | OXYGEN SATURATION: 99 %

## 2025-07-02 DIAGNOSIS — D50.0 IRON DEFICIENCY ANEMIA DUE TO CHRONIC BLOOD LOSS: Primary | ICD-10-CM

## 2025-07-02 PROBLEM — N92.0 MENORRHAGIA WITH REGULAR CYCLE: Status: ACTIVE | Noted: 2025-07-02

## 2025-07-02 PROBLEM — Z01.810 PREOP CARDIOVASCULAR EXAM: Status: RESOLVED | Noted: 2025-06-23 | Resolved: 2025-07-02

## 2025-07-02 PROBLEM — E87.6 HYPOKALEMIA: Status: RESOLVED | Noted: 2025-01-07 | Resolved: 2025-07-02

## 2025-07-02 PROBLEM — Z30.09 ENCOUNTER FOR CONSULTATION FOR FEMALE STERILIZATION: Status: RESOLVED | Noted: 2025-01-07 | Resolved: 2025-07-02

## 2025-07-02 PROBLEM — F60.3 BORDERLINE PERSONALITY DISORDER: Status: RESOLVED | Noted: 2025-01-07 | Resolved: 2025-07-02

## 2025-07-02 PROCEDURE — 96374 THER/PROPH/DIAG INJ IV PUSH: CPT

## 2025-07-02 PROCEDURE — 63600175 PHARM REV CODE 636 W HCPCS: Performed by: NURSE PRACTITIONER

## 2025-07-02 RX ORDER — SODIUM CHLORIDE 0.9 % (FLUSH) 0.9 %
10 SYRINGE (ML) INJECTION
OUTPATIENT
Start: 2025-07-09

## 2025-07-02 RX ORDER — EPINEPHRINE 0.3 MG/.3ML
0.3 INJECTION SUBCUTANEOUS ONCE AS NEEDED
Status: DISCONTINUED | OUTPATIENT
Start: 2025-07-02 | End: 2025-07-02 | Stop reason: HOSPADM

## 2025-07-02 RX ORDER — EPINEPHRINE 0.3 MG/.3ML
0.3 INJECTION SUBCUTANEOUS ONCE AS NEEDED
OUTPATIENT
Start: 2025-07-09

## 2025-07-02 RX ORDER — SODIUM CHLORIDE 0.9 % (FLUSH) 0.9 %
10 SYRINGE (ML) INJECTION
Status: DISCONTINUED | OUTPATIENT
Start: 2025-07-02 | End: 2025-07-02 | Stop reason: HOSPADM

## 2025-07-02 RX ADMIN — IRON SUCROSE 200 MG: 20 INJECTION, SOLUTION INTRAVENOUS at 03:07

## 2025-07-02 NOTE — H&P
[facility]  Obstetrics & Gynecology  History & Physical    Patient Name: Zoila Sheldon  MRN: [id number]  Admission Date: (Not on file)  Primary Care Provider: Kimberly Yañez MD    Subjective:     Chief Complaint/Reason for Admission:     History of Present Illness:  Heavy menses with anemia   Bilateral tubal ligation in the recent past   Normal ultrasound of the uterus   Desires surgical intervention with hysteroscopic ablation   Is aware of the 50 % return of bleeding rate and 10 to 15 % return of abnormal bleeding over the lifespan of her ovaries         OB History    Para Term  AB Living   7 4 0 4 3 4   SAB IAB Ectopic Multiple Live Births   0 2 0 0 4      # Outcome Date GA Lbr Arden/2nd Weight Sex Type Anes PTL Lv   7   33w0d   M  EPI  MARYLIN      Complications: Hypertension   6 Molar  25w0d             Complications: Preeclampsia   5 IAB            4   36w0d   M Vag-Spont None  MARYLIN      Complications: Encounter for blood transfusion   3   35w0d   M Vag-Spont EPI  MARYLIN   2 IAB            1   35w0d   F Vag-Spont None  MARYLIN     Past Medical History:   Diagnosis Date    Anemia, unspecified     Anxiety disorder, unspecified     Bipolar disorder, unspecified     Borderline personality disorder     Depression     Pituitary tumor      Past Surgical History:   Procedure Laterality Date    INDUCED       Surgical    LAPAROSCOPIC SALPINGECTOMY Bilateral 2025    Procedure: SALPINGECTOMY, LAPAROSCOPIC;  Surgeon: DEBBIE Will MD;  Location: [facility];  Service: OB/GYN;  Laterality: Bilateral;    [place] teeth surgery         No medications prior to admission.       Review of patient's allergies indicates:   Allergen Reactions    Lactase Diarrhea    Milk containing products (dairy) Diarrhea and Nausea And Vomiting        Family History       Problem Relation (Age of Onset)    ADD / ADHD Mother, Sister    Anxiety Mother    Bipolar  disorder Father    Celiac disease Maternal Grandfather    Colon cancer Paternal Grandfather    Depression Mother    Prostate cancer Maternal Grandfather          Tobacco Use    Smoking status: Never    Smokeless tobacco: Never   Vaping Use    Vaping status: Never Used   Substance and Sexual Activity    Alcohol use: Not Currently    Drug use: Not Currently     Types: Marijuana     Comment: smokes: 1gm/wk    Sexual activity: Not Currently     Partners: Male     Review of Systems   Constitutional:  Negative for appetite change, chills, fatigue, fever and unexpected weight change.   HENT: Negative.     Eyes:  Negative for visual disturbance.   Respiratory:  Negative for shortness of breath and wheezing.    Cardiovascular:  Negative for chest pain, palpitations and leg swelling.   Gastrointestinal:  Negative for abdominal pain, bloating, blood in stool, constipation, diarrhea, nausea and vomiting.   Endocrine: Negative for hair loss, hot flashes and hypothyroidism.   Genitourinary:  Positive for menorrhagia and menstrual problem. Negative for dysmenorrhea, dyspareunia, dysuria, flank pain, frequency, genital sores, hematuria, pelvic pain, urgency, vaginal bleeding, vaginal discharge, urinary incontinence and vaginal odor.   Musculoskeletal:  Negative for back pain, joint swelling and myalgias.   Integumentary:  Negative for rash, hair changes, breast mass, nipple discharge and breast skin changes.   Neurological:  Negative for syncope and headaches.   Hematological:  Negative for adenopathy. Does not bruise/bleed easily.   Psychiatric/Behavioral:  Negative for depression and sleep disturbance. The patient is not nervous/anxious.    Breast: Negative for mass, mastodynia, nipple discharge and skin changes     Objective:     Vital Signs (Most Recent):    Vital Signs (24h Range):  Temp:  [97.1 °F (36.2 °C)-97.7 °F (36.5 °C)] 97.7 °F (36.5 °C)  Pulse:  [90-98] 90  Resp:  [16-18] 16  SpO2:  [99 %] 99 %  BP: (111-123)/(74)  111/74     Weight: 74.4 kg (164 lb)  Body mass index is 27.29 kg/m².    Patient's last menstrual period was 06/22/2025.     Physical Exam:   Constitutional: She is oriented to person, place, and time. Vital signs are normal. She appears well-developed and well-nourished. No distress.    HENT:   Head: Normocephalic and atraumatic.   Right Ear: External ear normal.   Left Ear: External ear normal.   Nose: Nose normal.    Eyes: Pupils are equal, round, and reactive to light. Conjunctivae are normal.    Neck: Trachea normal. No JVD present. No thyroid mass and no thyromegaly present.    Cardiovascular:  Normal rate and regular rhythm.             Pulmonary/Chest: Effort normal and breath sounds normal. No respiratory distress.        Abdominal: Soft. Bowel sounds are normal. She exhibits no distension and no mass. There is no abdominal tenderness. No hernia. Hernia confirmed negative in the ventral area, confirmed negative in the right inguinal area and confirmed negative in the left inguinal area.     Genitourinary:    Vagina, uterus and rectum normal.   Rectum:      No external hemorrhoid or abnormal anal tone.     Labial bartholins normal.There is no rash, tenderness or lesion on the right labia. There is no rash, tenderness or lesion on the left labia. Cervix is normal. Right adnexum displays no mass, no tenderness and no fullness. Left adnexum displays no mass, no tenderness and no fullness. No vaginal discharge, tenderness, bleeding, rectocele, cystocele or prolapse of vaginal walls in the vagina.    No foreign body in the vagina.   Cervix exhibits no motion tenderness, no discharge and no friability. Uterus is not deviated, not enlarged and not tender.           Musculoskeletal: Normal range of motion.       Neurological: She is alert and oriented to person, place, and time. She has normal reflexes.    Skin: Skin is warm and dry. She is not diaphoretic.    Psychiatric: She has a normal mood and affect. Her speech  is normal and behavior is normal. Thought content normal.        Laboratory:  I have personallly reviewed all pertinent lab results from the last 24 hours.    Diagnostic Results:  Labs: Reviewed  US: Reviewed  Assessment/Plan:     Renal/  * Menorrhagia with regular cycle  Hysteroscopy with endometrial ablation         F Joe Will MD  Obstetrics & Gynecology  [facility]

## 2025-07-02 NOTE — SUBJECTIVE & OBJECTIVE
OB History    Para Term  AB Living   7 4 0 4 3 4   SAB IAB Ectopic Multiple Live Births   0 2 0 0 4      # Outcome Date GA Lbr Arden/2nd Weight Sex Type Anes PTL Lv   7   33w0d   M  EPI  MARYLIN      Complications: Hypertension   6 Molar  25w0d             Complications: Preeclampsia   5 IAB            4   36w0d   M Vag-Spont None  MARYLIN      Complications: Encounter for blood transfusion   3   35w0d   M Vag-Spont EPI  MARYLIN   2 IAB            1   35w0d   F Vag-Spont None  MARYLIN     Past Medical History:   Diagnosis Date    Anemia, unspecified     Anxiety disorder, unspecified     Bipolar disorder, unspecified     Borderline personality disorder     Depression     Pituitary tumor      Past Surgical History:   Procedure Laterality Date    INDUCED       Surgical    LAPAROSCOPIC SALPINGECTOMY Bilateral 2025    Procedure: SALPINGECTOMY, LAPAROSCOPIC;  Surgeon: DEBBIE Will MD;  Location: [facility];  Service: OB/GYN;  Laterality: Bilateral;    [place] teeth surgery         No medications prior to admission.       Review of patient's allergies indicates:   Allergen Reactions    Lactase Diarrhea    Milk containing products (dairy) Diarrhea and Nausea And Vomiting        Family History       Problem Relation (Age of Onset)    ADD / ADHD Mother, Sister    Anxiety Mother    Bipolar disorder Father    Celiac disease Maternal Grandfather    Colon cancer Paternal Grandfather    Depression Mother    Prostate cancer Maternal Grandfather          Tobacco Use    Smoking status: Never    Smokeless tobacco: Never   Vaping Use    Vaping status: Never Used   Substance and Sexual Activity    Alcohol use: Not Currently    Drug use: Not Currently     Types: Marijuana     Comment: smokes: 1gm/wk    Sexual activity: Not Currently     Partners: Male     Review of Systems   Constitutional:  Negative for appetite change, chills, fatigue, fever and unexpected weight change.   HENT: Negative.      Eyes:  Negative for visual disturbance.   Respiratory:  Negative for shortness of breath and wheezing.    Cardiovascular:  Negative for chest pain, palpitations and leg swelling.   Gastrointestinal:  Negative for abdominal pain, bloating, blood in stool, constipation, diarrhea, nausea and vomiting.   Endocrine: Negative for hair loss, hot flashes and hypothyroidism.   Genitourinary:  Positive for menorrhagia and menstrual problem. Negative for dysmenorrhea, dyspareunia, dysuria, flank pain, frequency, genital sores, hematuria, pelvic pain, urgency, vaginal bleeding, vaginal discharge, urinary incontinence and vaginal odor.   Musculoskeletal:  Negative for back pain, joint swelling and myalgias.   Integumentary:  Negative for rash, hair changes, breast mass, nipple discharge and breast skin changes.   Neurological:  Negative for syncope and headaches.   Hematological:  Negative for adenopathy. Does not bruise/bleed easily.   Psychiatric/Behavioral:  Negative for depression and sleep disturbance. The patient is not nervous/anxious.    Breast: Negative for mass, mastodynia, nipple discharge and skin changes     Objective:     Vital Signs (Most Recent):    Vital Signs (24h Range):  Temp:  [97.1 °F (36.2 °C)-97.7 °F (36.5 °C)] 97.7 °F (36.5 °C)  Pulse:  [90-98] 90  Resp:  [16-18] 16  SpO2:  [99 %] 99 %  BP: (111-123)/(74) 111/74     Weight: 74.4 kg (164 lb)  Body mass index is 27.29 kg/m².    Patient's last menstrual period was 06/22/2025.     Physical Exam:   Constitutional: She is oriented to person, place, and time. Vital signs are normal. She appears well-developed and well-nourished. No distress.    HENT:   Head: Normocephalic and atraumatic.   Right Ear: External ear normal.   Left Ear: External ear normal.   Nose: Nose normal.    Eyes: Pupils are equal, round, and reactive to light. Conjunctivae are normal.    Neck: Trachea normal. No JVD present. No thyroid mass and no thyromegaly present.    Cardiovascular:   Normal rate and regular rhythm.             Pulmonary/Chest: Effort normal and breath sounds normal. No respiratory distress.        Abdominal: Soft. Bowel sounds are normal. She exhibits no distension and no mass. There is no abdominal tenderness. No hernia. Hernia confirmed negative in the ventral area, confirmed negative in the right inguinal area and confirmed negative in the left inguinal area.     Genitourinary:    Vagina, uterus and rectum normal.   Rectum:      No external hemorrhoid or abnormal anal tone.     Labial bartholins normal.There is no rash, tenderness or lesion on the right labia. There is no rash, tenderness or lesion on the left labia. Cervix is normal. Right adnexum displays no mass, no tenderness and no fullness. Left adnexum displays no mass, no tenderness and no fullness. No vaginal discharge, tenderness, bleeding, rectocele, cystocele or prolapse of vaginal walls in the vagina.    No foreign body in the vagina.   Cervix exhibits no motion tenderness, no discharge and no friability. Uterus is not deviated, not enlarged and not tender.           Musculoskeletal: Normal range of motion.       Neurological: She is alert and oriented to person, place, and time. She has normal reflexes.    Skin: Skin is warm and dry. She is not diaphoretic.    Psychiatric: She has a normal mood and affect. Her speech is normal and behavior is normal. Thought content normal.        Laboratory:  I have personallly reviewed all pertinent lab results from the last 24 hours.    Diagnostic Results:  Labs: Reviewed  US: Reviewed

## 2025-07-02 NOTE — DISCHARGE INSTRUCTIONS
[facility]  [address]  [address]  [phone] phone     [phone] fax  Hours of Operation: Monday- Friday 8:00am- 5:00pm  After hours phone  [phone]  Hematology / Oncology Physicians on call      FELICIA Ross Dr., NP Phaon Dunbar, NP Khelsea Conley, FNP    Please call with any concerns regarding your appointment today.

## 2025-07-02 NOTE — HPI
Heavy menses with anemia   Bilateral tubal ligation in the recent past   Normal ultrasound of the uterus   Desires surgical intervention with hysteroscopic ablation   Is aware of the 50 % return of bleeding rate and 10 to 15 % return of abnormal bleeding over the lifespan of her ovaries

## 2025-07-02 NOTE — PLAN OF CARE
Problem: Adult Inpatient Plan of Care  Goal: Plan of Care Review  Outcome: Progressing  Flowsheets (Taken 7/2/2025 1517)  Plan of Care Reviewed With: patient  Goal: Patient-Specific Goal (Individualized)  Outcome: Progressing  Flowsheets (Taken 7/2/2025 1517)  Individualized Care Needs: warm blanket, feet up  Anxieties, Fears or Concerns: first time anxious  Patient/Family-Specific Goals (Include Timeframe): tolerate iron  Goal: Optimal Comfort and Wellbeing  Outcome: Progressing

## 2025-07-03 ENCOUNTER — HOSPITAL ENCOUNTER (OUTPATIENT)
Facility: HOSPITAL | Age: 30
Discharge: HOME | End: 2025-07-03
Attending: OBSTETRICS & GYNECOLOGY | Admitting: OBSTETRICS & GYNECOLOGY
Payer: MEDICAID

## 2025-07-03 ENCOUNTER — ANESTHESIA (OUTPATIENT)
Dept: SURGERY | Facility: HOSPITAL | Age: 30
End: 2025-07-03
Payer: MEDICAID

## 2025-07-03 VITALS
HEART RATE: 79 BPM | BODY MASS INDEX: 27.39 KG/M2 | HEIGHT: 65 IN | RESPIRATION RATE: 14 BRPM | DIASTOLIC BLOOD PRESSURE: 70 MMHG | TEMPERATURE: 98 F | SYSTOLIC BLOOD PRESSURE: 110 MMHG | WEIGHT: 164.38 LBS | OXYGEN SATURATION: 100 %

## 2025-07-03 DIAGNOSIS — N92.0 MENORRHAGIA WITH REGULAR CYCLE: ICD-10-CM

## 2025-07-03 LAB
B-HCG UR QL: NEGATIVE
CTP QC/QA: YES

## 2025-07-03 PROCEDURE — 27200651 HC AIRWAY, LMA: Performed by: ANESTHESIOLOGY

## 2025-07-03 PROCEDURE — 71000033 HC RECOVERY, INTIAL HOUR: Performed by: OBSTETRICS & GYNECOLOGY

## 2025-07-03 PROCEDURE — 37000009 HC ANESTHESIA EA ADD 15 MINS: Performed by: OBSTETRICS & GYNECOLOGY

## 2025-07-03 PROCEDURE — 71000015 HC POSTOP RECOV 1ST HR: Performed by: OBSTETRICS & GYNECOLOGY

## 2025-07-03 PROCEDURE — 81025 URINE PREGNANCY TEST: CPT | Performed by: OBSTETRICS & GYNECOLOGY

## 2025-07-03 PROCEDURE — 63600175 PHARM REV CODE 636 W HCPCS: Performed by: ANESTHESIOLOGY

## 2025-07-03 PROCEDURE — 27201423 OPTIME MED/SURG SUP & DEVICES STERILE SUPPLY: Performed by: OBSTETRICS & GYNECOLOGY

## 2025-07-03 PROCEDURE — 37000008 HC ANESTHESIA 1ST 15 MINUTES: Performed by: OBSTETRICS & GYNECOLOGY

## 2025-07-03 PROCEDURE — 25000003 PHARM REV CODE 250: Performed by: OBSTETRICS & GYNECOLOGY

## 2025-07-03 PROCEDURE — 36000707: Performed by: OBSTETRICS & GYNECOLOGY

## 2025-07-03 PROCEDURE — 36000706: Performed by: OBSTETRICS & GYNECOLOGY

## 2025-07-03 RX ORDER — KETOROLAC TROMETHAMINE 30 MG/ML
INJECTION, SOLUTION INTRAMUSCULAR; INTRAVENOUS
Status: DISCONTINUED | OUTPATIENT
Start: 2025-07-03 | End: 2025-07-03

## 2025-07-03 RX ORDER — ONDANSETRON HYDROCHLORIDE 2 MG/ML
INJECTION, SOLUTION INTRAVENOUS
Status: DISCONTINUED | OUTPATIENT
Start: 2025-07-03 | End: 2025-07-03

## 2025-07-03 RX ORDER — SODIUM CHLORIDE, SODIUM LACTATE, POTASSIUM CHLORIDE, CALCIUM CHLORIDE 600; 310; 30; 20 MG/100ML; MG/100ML; MG/100ML; MG/100ML
INJECTION, SOLUTION INTRAVENOUS CONTINUOUS
Status: DISCONTINUED | OUTPATIENT
Start: 2025-07-03 | End: 2025-07-03 | Stop reason: HOSPADM

## 2025-07-03 RX ORDER — ONDANSETRON 4 MG/1
8 TABLET, ORALLY DISINTEGRATING ORAL EVERY 8 HOURS PRN
Status: CANCELLED | OUTPATIENT
Start: 2025-07-03

## 2025-07-03 RX ORDER — LIDOCAINE HYDROCHLORIDE 10 MG/ML
1 INJECTION, SOLUTION EPIDURAL; INFILTRATION; INTRACAUDAL; PERINEURAL ONCE
Status: DISCONTINUED | OUTPATIENT
Start: 2025-07-03 | End: 2025-07-03 | Stop reason: HOSPADM

## 2025-07-03 RX ORDER — MIDAZOLAM HYDROCHLORIDE 1 MG/ML
INJECTION INTRAMUSCULAR; INTRAVENOUS
Status: DISCONTINUED | OUTPATIENT
Start: 2025-07-03 | End: 2025-07-03

## 2025-07-03 RX ORDER — DEXAMETHASONE SODIUM PHOSPHATE 4 MG/ML
INJECTION, SOLUTION INTRA-ARTICULAR; INTRALESIONAL; INTRAMUSCULAR; INTRAVENOUS; SOFT TISSUE
Status: DISCONTINUED | OUTPATIENT
Start: 2025-07-03 | End: 2025-07-03

## 2025-07-03 RX ORDER — FAMOTIDINE 20 MG/1
20 TABLET, FILM COATED ORAL
Status: DISCONTINUED | OUTPATIENT
Start: 2025-07-03 | End: 2025-07-03 | Stop reason: HOSPADM

## 2025-07-03 RX ORDER — ACETAMINOPHEN 500 MG
1000 TABLET ORAL
Status: COMPLETED | OUTPATIENT
Start: 2025-07-03 | End: 2025-07-03

## 2025-07-03 RX ORDER — KETOROLAC TROMETHAMINE 30 MG/ML
15 INJECTION, SOLUTION INTRAMUSCULAR; INTRAVENOUS EVERY 6 HOURS PRN
Status: CANCELLED | OUTPATIENT
Start: 2025-07-03 | End: 2025-07-06

## 2025-07-03 RX ORDER — LIDOCAINE HYDROCHLORIDE 20 MG/ML
INJECTION, SOLUTION EPIDURAL; INFILTRATION; INTRACAUDAL; PERINEURAL
Status: DISCONTINUED | OUTPATIENT
Start: 2025-07-03 | End: 2025-07-03

## 2025-07-03 RX ORDER — FAMOTIDINE 20 MG/1
20 TABLET, FILM COATED ORAL
Status: COMPLETED | OUTPATIENT
Start: 2025-07-03 | End: 2025-07-03

## 2025-07-03 RX ORDER — PROPOFOL 10 MG/ML
VIAL (ML) INTRAVENOUS
Status: DISCONTINUED | OUTPATIENT
Start: 2025-07-03 | End: 2025-07-03

## 2025-07-03 RX ORDER — FENTANYL CITRATE 50 UG/ML
INJECTION, SOLUTION INTRAMUSCULAR; INTRAVENOUS
Status: DISCONTINUED | OUTPATIENT
Start: 2025-07-03 | End: 2025-07-03

## 2025-07-03 RX ADMIN — LIDOCAINE HYDROCHLORIDE 80 MG: 20 INJECTION, SOLUTION EPIDURAL; INFILTRATION; INTRACAUDAL; PERINEURAL at 11:07

## 2025-07-03 RX ADMIN — ONDANSETRON 4 MG: 2 INJECTION INTRAMUSCULAR; INTRAVENOUS at 11:07

## 2025-07-03 RX ADMIN — KETOROLAC TROMETHAMINE 30 MG: 30 INJECTION, SOLUTION INTRAMUSCULAR; INTRAVENOUS at 11:07

## 2025-07-03 RX ADMIN — MIDAZOLAM 2 MG: 1 INJECTION INTRAMUSCULAR; INTRAVENOUS at 11:07

## 2025-07-03 RX ADMIN — FENTANYL CITRATE 100 MCG: 50 INJECTION, SOLUTION INTRAMUSCULAR; INTRAVENOUS at 11:07

## 2025-07-03 RX ADMIN — SODIUM CHLORIDE, POTASSIUM CHLORIDE, SODIUM LACTATE AND CALCIUM CHLORIDE: 600; 310; 30; 20 INJECTION, SOLUTION INTRAVENOUS at 11:07

## 2025-07-03 RX ADMIN — PROPOFOL 200 MG: 10 INJECTION, EMULSION INTRAVENOUS at 11:07

## 2025-07-03 RX ADMIN — FAMOTIDINE 20 MG: 20 TABLET ORAL at 09:07

## 2025-07-03 RX ADMIN — DEXAMETHASONE SODIUM PHOSPHATE 8 MG: 4 INJECTION, SOLUTION INTRA-ARTICULAR; INTRALESIONAL; INTRAMUSCULAR; INTRAVENOUS; SOFT TISSUE at 11:07

## 2025-07-03 RX ADMIN — ACETAMINOPHEN 1000 MG: 500 TABLET ORAL at 09:07

## 2025-07-03 NOTE — OP NOTE
[facility]  Surgery Department  Operative Note    SUMMARY     Date of Procedure: 7/3/2025     Procedure: Procedure(s) (LRB):  ABLATION, ENDOMETRIUM, HYSTEROSCOPIC (N/A)     Surgeons and Role:     * DEBBIE Will MD - Primary    Assisting Surgeon: None    Pre-Operative Diagnosis: Menorrhagia with regular cycle [N92.0]    Post-Operative Diagnosis: Post-Op Diagnosis Codes:     * Menorrhagia with regular cycle [N92.0]    Anesthesia: General    Operative Findings (including complications, if any):      Endocervical canal free of lesions   Endometrial cavity is smooth with no lesions     Sounds to 10 cm with cervical length of 5 cm      Width of the cavity 3 cm      Power 83 nguyễn with burn time of 1:35     Description of Technical Procedures:       INTRAOPERATIVE FINDINGS:  The patient's overall endometrium appeared         Smooth with no lesions.  The fallopian ostia are noted bilaterally.                                                                                                                                                    DESCRIPTION OF PROCEDURE:  The patient was.                                                                      placed in the dorsal lithotomy position with her legs in       The Eligio stirrups                                                                     Her perineum was then prepped and draped in the normal    sterile fashion and her bladder was drained in and out fashion.  A           weighted sterile speculum was then placed in the patient's vagina.                                                                      Anterior lip of the cervix was grasped with a single-tooth tenaculum.                                                                        The  Cervix was then dilated with the Hegar dilators up to about 7 mm    .  A 0-degree      hysteroscope was then advanced through the cervix into the intrauterine      cavity using normal saline as a  distention medium.  Examination of the       entire uterine cavity was performed.  Panhysteroscopic view achieved,  bilateral tubal ostia were         Visualized.        Ablation was done with Novosure Instrument     Single-tooth tenaculum was then removed from the patient's cervix and        good hemostasis was noted at the tenaculum site                                                                        All other instruments were      removed from the vagina.    The patient tolerated the procedure well.     Total fluid in was within 100 cc of total fluid out        Significant Surgical Tasks Conducted by the Assistant(s), if Applicable: none     Estimated Blood Loss (EBL): 5 mL           Implants: * No implants in log *    Specimens:   Specimen (24h ago, onward)      None           * No specimens in log *           Condition: Good    Disposition: PACU - hemodynamically stable.    Attestation: Op Note Attestation: I was physically present and scrubbed for the entire procedure.

## 2025-07-03 NOTE — ANESTHESIA PROCEDURE NOTES
Intubation    Date/Time: 7/3/2025 11:18 AM    Performed by: Samanta Rodriguez CRNA  Authorized by: Mirna Parham MD    Intubation:     Induction:  Intravenous    Intubated:  Postinduction    Mask Ventilation:  Easy mask    Attempts:  1    Attempted By:  CRNA    Difficult Airway Encountered?: No      Complications:  None    Airway Device:  Supraglottic airway/LMA    Airway Device Size:  4.0    Style/Cuff Inflation:  Uncuffed    Placement Verified By:  Capnometry    Complicating Factors:  None    Findings Post-Intubation:  BS equal bilateral and atraumatic/condition of teeth unchanged  Notes:      igel

## 2025-07-03 NOTE — ANESTHESIA POSTPROCEDURE EVALUATION
Anesthesia Post Evaluation    Patient: Zoila Sheldon    Procedure(s) Performed: Procedure(s) (LRB):  ABLATION, ENDOMETRIUM, HYSTEROSCOPIC (N/A)    Final Anesthesia Type: general      Patient location during evaluation: PACU  Patient participation: Yes- Able to Participate  Level of consciousness: awake and alert and oriented  Post-procedure vital signs: reviewed and stable  Pain management: adequate  Airway patency: patent    PONV status at discharge: No PONV  Anesthetic complications: no      Cardiovascular status: blood pressure returned to baseline, stable and hemodynamically stable  Respiratory status: unassisted  Hydration status: euvolemic  Follow-up not needed.              Vitals Value Taken Time   /70 07/03/25 12:10   Temp 36.5 °C (97.7 °F) 07/03/25 11:43   Pulse 84 07/03/25 12:14   Resp 18 07/03/25 12:14   SpO2 100 % 07/03/25 12:14   Vitals shown include unfiled device data.      Event Time   Out of Recovery 12:00:00         Pain/Kwame Score: Pain Rating Prior to Med Admin: 0 (7/3/2025  9:29 AM)  Kwame Score: 10 (7/3/2025 12:15 PM)

## 2025-07-03 NOTE — TRANSFER OF CARE
"Anesthesia Transfer of Care Note    Patient: Zoila Sheldon    Procedure(s) Performed: Procedure(s) (LRB):  ABLATION, ENDOMETRIUM, HYSTEROSCOPIC (N/A)    Patient location: PACU    Anesthesia Type: general    Transport from OR: Transported from OR on room air with adequate spontaneous ventilation    Post pain: adequate analgesia    Post assessment: no apparent anesthetic complications and tolerated procedure well    Post vital signs: stable    Level of consciousness: sedated    Nausea/Vomiting: no nausea/vomiting    Complications: none    Transfer of care protocol was followed      Last vitals: Visit Vitals  /76 (BP Location: Right arm, Patient Position: Sitting)   Pulse 87   Temp 37 °C (98.6 °F) (Temporal)   Resp 18   Ht 5' 5" (1.651 m)   Wt 74.6 kg (164 lb 5.7 oz)   LMP 06/22/2025   SpO2 100%   Breastfeeding No   BMI 27.35 kg/m²     "

## 2025-07-03 NOTE — DISCHARGE SUMMARY
[facility]  Discharge Note  Short Stay    Procedure(s) (LRB):  ABLATION, ENDOMETRIUM, HYSTEROSCOPIC (N/A)      OUTCOME: Patient tolerated treatment/procedure well without complication and is now ready for discharge.    DISPOSITION: Home or Self Care    FINAL DIAGNOSIS:  Menorrhagia with regular cycle    FOLLOWUP: In clinic    DISCHARGE INSTRUCTIONS:    Discharge Procedure Orders   Diet general     No dressing needed     Call MD for:  temperature >100.4     Call MD for:  persistent nausea and vomiting     Call MD for:  severe uncontrolled pain     Call MD for:  difficulty breathing, headache or visual disturbances     Call MD for:  redness, tenderness, or signs of infection (pain, swelling, redness, odor or green/yellow discharge around incision site)     Call MD for:  hives     Call MD for:  persistent dizziness or light-headedness     Call MD for:  extreme fatigue     Shower on day dressing removed (No bath)        TIME SPENT ON DISCHARGE: 5 minutes

## 2025-07-06 ENCOUNTER — RESULTS FOLLOW-UP (OUTPATIENT)
Dept: GASTROENTEROLOGY | Facility: HOSPITAL | Age: 30
End: 2025-07-06

## 2025-07-08 ENCOUNTER — TELEPHONE (OUTPATIENT)
Dept: INFUSION THERAPY | Facility: HOSPITAL | Age: 30
End: 2025-07-08
Payer: MEDICAID

## 2025-07-08 ENCOUNTER — TELEPHONE (OUTPATIENT)
Dept: HEMATOLOGY/ONCOLOGY | Facility: CLINIC | Age: 30
End: 2025-07-08
Payer: MEDICAID

## 2025-07-08 NOTE — TELEPHONE ENCOUNTER
Left a message for Ms. Cummings explaining to her that her due to her changing insurances, [facility] is now Primary and is not accepted at an [facility] location. For this reason, her appointments with [facility] will be canceled. Patient can contact a Fin Coordinator to discuss self pay options if she is willing to take on full financial responsibilities for her care. Patient can also call her insurance provider for more facilities who are within network and contact the facility to assure they accept the insurance prior to making an appointment. Jeremy message sent. Infusion notified to cancel

## 2025-07-08 NOTE — TELEPHONE ENCOUNTER
Spoke with the patient and I notified her that [facility] does not accept her insurance and that she needed to contact her insurance to get information on what facility does accept her insurance. Patient verbalized understanding and infusion appts have been cancelled.

## 2025-07-09 ENCOUNTER — PATIENT MESSAGE (OUTPATIENT)
Dept: HEMATOLOGY/ONCOLOGY | Facility: CLINIC | Age: 30
End: 2025-07-09
Payer: MEDICAID

## 2025-07-23 DIAGNOSIS — D50.9 IRON DEFICIENCY ANEMIA, UNSPECIFIED IRON DEFICIENCY ANEMIA TYPE: Primary | ICD-10-CM

## 2025-08-17 ENCOUNTER — PATIENT MESSAGE (OUTPATIENT)
Dept: OBSTETRICS AND GYNECOLOGY | Facility: CLINIC | Age: 30
End: 2025-08-17
Payer: MEDICAID

## 2025-08-18 ENCOUNTER — PATIENT MESSAGE (OUTPATIENT)
Dept: PSYCHIATRY | Facility: CLINIC | Age: 30
End: 2025-08-18
Payer: MEDICAID

## 2025-08-19 ENCOUNTER — OFFICE VISIT (OUTPATIENT)
Dept: PSYCHIATRY | Facility: CLINIC | Age: 30
End: 2025-08-19
Payer: MEDICAID

## 2025-08-19 DIAGNOSIS — F41.0 GENERALIZED ANXIETY DISORDER WITH PANIC ATTACKS: ICD-10-CM

## 2025-08-19 DIAGNOSIS — F43.10 POST TRAUMATIC STRESS DISORDER (PTSD): ICD-10-CM

## 2025-08-19 DIAGNOSIS — G47.00 INSOMNIA, UNSPECIFIED TYPE: ICD-10-CM

## 2025-08-19 DIAGNOSIS — F90.2 ATTENTION DEFICIT HYPERACTIVITY DISORDER (ADHD), COMBINED TYPE: ICD-10-CM

## 2025-08-19 DIAGNOSIS — F31.32 BIPOLAR AFFECTIVE DISORDER, CURRENTLY DEPRESSED, MODERATE: Primary | ICD-10-CM

## 2025-08-19 DIAGNOSIS — Z13.39 ATTENTION DEFICIT HYPERACTIVITY DISORDER (ADHD) EVALUATION: ICD-10-CM

## 2025-08-19 DIAGNOSIS — F41.1 GENERALIZED ANXIETY DISORDER WITH PANIC ATTACKS: ICD-10-CM

## 2025-08-19 PROCEDURE — 1159F MED LIST DOCD IN RCRD: CPT | Mod: CPTII,95,, | Performed by: PSYCHOLOGIST

## 2025-08-19 PROCEDURE — 3044F HG A1C LEVEL LT 7.0%: CPT | Mod: CPTII,95,, | Performed by: PSYCHOLOGIST

## 2025-08-19 PROCEDURE — 98006 SYNCH AUDIO-VIDEO EST MOD 30: CPT | Mod: 95,,, | Performed by: PSYCHOLOGIST

## 2025-08-19 RX ORDER — QUETIAPINE FUMARATE 50 MG/1
25-50 TABLET, FILM COATED ORAL NIGHTLY
Qty: 30 TABLET | Refills: 2 | Status: SHIPPED | OUTPATIENT
Start: 2025-08-19

## 2025-08-19 RX ORDER — ATOMOXETINE 40 MG/1
40 CAPSULE ORAL DAILY
Qty: 30 CAPSULE | Refills: 2 | Status: SHIPPED | OUTPATIENT
Start: 2025-08-19 | End: 2025-11-17

## 2025-08-19 RX ORDER — DULOXETIN HYDROCHLORIDE 30 MG/1
30 CAPSULE, DELAYED RELEASE ORAL DAILY
Qty: 30 CAPSULE | Refills: 2 | Status: SHIPPED | OUTPATIENT
Start: 2025-08-19

## 2025-08-19 RX ORDER — CLONAZEPAM 0.5 MG/1
0.5 TABLET ORAL 2 TIMES DAILY PRN
Qty: 30 TABLET | Refills: 2 | Status: SHIPPED | OUTPATIENT
Start: 2025-08-19 | End: 2025-11-17

## 2025-08-20 ENCOUNTER — OFFICE VISIT (OUTPATIENT)
Dept: OBSTETRICS AND GYNECOLOGY | Facility: CLINIC | Age: 30
End: 2025-08-20
Payer: MEDICAID

## 2025-08-20 DIAGNOSIS — N93.8 DUB (DYSFUNCTIONAL UTERINE BLEEDING): Primary | ICD-10-CM

## 2025-08-20 DIAGNOSIS — N93.8 DUB (DYSFUNCTIONAL UTERINE BLEEDING): ICD-10-CM

## 2025-08-20 PROCEDURE — 3044F HG A1C LEVEL LT 7.0%: CPT | Mod: CPTII,95,, | Performed by: OBSTETRICS & GYNECOLOGY

## 2025-08-20 PROCEDURE — 98004 SYNCH AUDIO-VIDEO EST SF 10: CPT | Mod: 95,,, | Performed by: OBSTETRICS & GYNECOLOGY

## 2025-08-20 RX ORDER — NORETHINDRONE ACETATE 5 MG/1
5 TABLET ORAL DAILY
Qty: 90 TABLET | OUTPATIENT
Start: 2025-08-20

## 2025-08-20 RX ORDER — NORETHINDRONE 5 MG/1
5 TABLET ORAL DAILY
Qty: 30 TABLET | Refills: 1 | Status: SHIPPED | OUTPATIENT
Start: 2025-08-20 | End: 2025-10-19

## 2025-08-22 ENCOUNTER — TELEPHONE (OUTPATIENT)
Dept: OBSTETRICS AND GYNECOLOGY | Facility: CLINIC | Age: 30
End: 2025-08-22
Payer: MEDICAID

## 2025-08-22 DIAGNOSIS — N93.8 DUB (DYSFUNCTIONAL UTERINE BLEEDING): Primary | ICD-10-CM

## 2025-08-22 DIAGNOSIS — Z01.818 PRE-OP TESTING: ICD-10-CM

## 2025-08-27 ENCOUNTER — OFFICE VISIT (OUTPATIENT)
Dept: HEMATOLOGY/ONCOLOGY | Facility: CLINIC | Age: 30
End: 2025-08-27
Payer: MEDICAID

## 2025-08-27 DIAGNOSIS — D50.0 IRON DEFICIENCY ANEMIA DUE TO CHRONIC BLOOD LOSS: ICD-10-CM

## 2025-08-27 DIAGNOSIS — N92.0 MENORRHAGIA WITH REGULAR CYCLE: Primary | ICD-10-CM

## 2025-08-27 PROCEDURE — 99499 UNLISTED E&M SERVICE: CPT | Mod: 95,,, | Performed by: NURSE PRACTITIONER

## 2025-09-05 DIAGNOSIS — N92.0 MENORRHAGIA WITH REGULAR CYCLE: ICD-10-CM

## 2025-09-05 DIAGNOSIS — D50.0 IRON DEFICIENCY ANEMIA DUE TO CHRONIC BLOOD LOSS: Primary | ICD-10-CM

## (undated) DEVICE — SET CYSTO IRRIGATION UNIV SPIK

## (undated) DEVICE — PAD CURAD NONADH 3X4IN

## (undated) DEVICE — GLOVE SURGICAL LATEX SZ 6

## (undated) DEVICE — GLOVE SURG BIOGEL LATEX SZ 7.5

## (undated) DEVICE — COVER PROXIMA MAYO STAND

## (undated) DEVICE — KIT TURNOVER

## (undated) DEVICE — CORD LAP 10 DISP

## (undated) DEVICE — NDL PNEUMO INSUFFLATI 120MM

## (undated) DEVICE — COVER CAMERA OPERATING ROOM

## (undated) DEVICE — ELECTRODE REM PLYHSV RETURN 9

## (undated) DEVICE — KIT ANTIFOG

## (undated) DEVICE — TROCAR ENDOPATH XCEL 5X100MM

## (undated) DEVICE — DRAPE LAVH SURG 109X109X100IN

## (undated) DEVICE — SET PNEUMOCLEAR HEAT HUM SE HF

## (undated) DEVICE — MANIPULATOR UTERINE

## (undated) DEVICE — SYR 50CC LL

## (undated) DEVICE — PACK BASIC SETUP SC BR

## (undated) DEVICE — PACK DRAPE PERI/GYN TIBURON

## (undated) DEVICE — DRAPE STERI LONG

## (undated) DEVICE — SEE L#152161

## (undated) DEVICE — SOL NACL 0.9% IV INJ 1000ML

## (undated) DEVICE — TRAY SKIN SCRUB WET PREMIUM

## (undated) DEVICE — SEALER VESSEL EXTEND

## (undated) DEVICE — DRESSING TELFA N ADH 3X8

## (undated) DEVICE — DRAPE UINDERBUT GRAD PCH

## (undated) DEVICE — SOL WATER STRL IRR 1000ML

## (undated) DEVICE — CONTAINER SPECIMEN OR STER 4OZ

## (undated) DEVICE — SYR 3CC LUER LOC

## (undated) DEVICE — TROCAR ENDOPATH XCEL 8MM 10CM

## (undated) DEVICE — EVACUATOR PLUME AWAY

## (undated) DEVICE — SYR 10CC LUER LOCK

## (undated) DEVICE — SUT MONOCRYL 4-0 PS-1 UND

## (undated) DEVICE — MANIFOLD 4 PORT

## (undated) DEVICE — TOWEL OR DISP STRL BLUE 4/PK

## (undated) DEVICE — TRAY CATH URETHRAL FOLEY 16FR

## (undated) DEVICE — GOWN POLY REINF BRTH SLV XL

## (undated) DEVICE — COVER LIGHT HANDLE 80/CA

## (undated) DEVICE — DEVICE ABLATION NOVASURE DISP

## (undated) DEVICE — CATH URETHRAL RED 16FR

## (undated) DEVICE — APPLICATOR CHLORAPREP ORN 26ML

## (undated) DEVICE — ADHESIVE DERMABOND ADVANCED